# Patient Record
Sex: MALE | Race: BLACK OR AFRICAN AMERICAN | Employment: OTHER | URBAN - METROPOLITAN AREA
[De-identification: names, ages, dates, MRNs, and addresses within clinical notes are randomized per-mention and may not be internally consistent; named-entity substitution may affect disease eponyms.]

---

## 2020-04-08 ENCOUNTER — HOSPITAL ENCOUNTER (EMERGENCY)
Age: 75
Discharge: HOME | End: 2020-04-08
Attending: EMERGENCY MEDICINE
Payer: MEDICARE

## 2020-04-08 ENCOUNTER — APPOINTMENT (OUTPATIENT)
Dept: GENERAL RADIOLOGY | Age: 75
End: 2020-04-08
Payer: MEDICARE

## 2020-04-08 VITALS
HEIGHT: 72 IN | HEART RATE: 88 BPM | RESPIRATION RATE: 18 BRPM | DIASTOLIC BLOOD PRESSURE: 86 MMHG | SYSTOLIC BLOOD PRESSURE: 136 MMHG | BODY MASS INDEX: 32.7 KG/M2 | OXYGEN SATURATION: 94 % | WEIGHT: 241.44 LBS | TEMPERATURE: 98.2 F

## 2020-04-08 PROCEDURE — 71045 X-RAY EXAM CHEST 1 VIEW: CPT

## 2020-04-08 PROCEDURE — 99283 EMERGENCY DEPT VISIT LOW MDM: CPT

## 2020-04-08 RX ORDER — PSEUDOEPHEDRINE HCL 120 MG/1
120 TABLET, FILM COATED, EXTENDED RELEASE ORAL EVERY 12 HOURS PRN
Qty: 20 TABLET | Refills: 1 | Status: SHIPPED | OUTPATIENT
Start: 2020-04-08 | End: 2020-04-15

## 2020-04-08 ASSESSMENT — PAIN DESCRIPTION - ORIENTATION: ORIENTATION: LEFT

## 2020-04-08 ASSESSMENT — PAIN SCALES - GENERAL: PAINLEVEL_OUTOF10: 4

## 2020-04-08 ASSESSMENT — PAIN DESCRIPTION - LOCATION: LOCATION: EAR

## 2020-04-08 NOTE — ED PROVIDER NOTES
EMERGENCY DEPARTMENT PHYSICIAN DOCUMENTATION      CHIEF COMPLAINT  Otalgia and Cough      HISTORY OF PRESENT ILLNESS  Ginna Fan is a 69 y.o. male with complaint of Otalgia and Cough    Onset of symptoms 7 days ago. Sore throat is sharp, worse with swallowing, no radiation. No throat discharge. +blowing nose and + L ear pain  Associated with some nasal congestion. Cough is mildly productive, no hemoptysis. Occasional streak of blood in phlegm, however    No db fevers. +chills        REVIEW OF SYSTEMS  A full 10 point Review of Systems was performed and is negative aside from pertinent positives mentioned in HPI    ALLERGIES:  No Known Allergies    PAST HISTORY  Past Medical History:   Diagnosis Date    Cancer ([facility] [facility]     throat     COPD (chronic obstructive pulmonary disease) ([facility]        History reviewed. No pertinent family history. No current facility-administered medications on file prior to encounter. Current Outpatient Medications on File Prior to Encounter   Medication Sig Dispense Refill    tiotropium (SPIRIVA) 18 MCG inhalation capsule Inhale 18 mcg into the lungs daily      levothyroxine (SYNTHROID) 150 MCG tablet Take 150 mcg by mouth Daily      amLODIPine (NORVASC) 5 MG tablet Take 5 mg by mouth daily         Social History     Tobacco Use    Smoking status: Former Smoker     Types: Cigarettes     Last attempt to quit: 6/3/2005     Years since quittin.8   Substance Use Topics    Alcohol use: No    Drug use: Not on file         EXAM:   Presentation Vital Signs: Pulse 88   Temp 98.2 °F (36.8 °C) (Oral)   Resp 18   Ht 6' (1.829 m)   Wt 109.5 kg (241 lb 7 oz)   SpO2 94%   BMI 32.74 kg/m²     General: Well nourished, no acute distress  Head: No traumatic injury  ENT: MMM, no facial asymmetry, no nasal discharge  +L ear TM erythematous no purulence  Pharynx shows normal non-significantly enlarged tonsils without exudates.   No pharyngeal lesions or uvular deviation  Eyes: EOM  Neck: No tracheal deviation or stridor  Lungs: Respirations clear to ausculation, no respiratory distress  Cardiac: Regular rate and rhythm without gallops, murmurs, or rubs  Skin: no pallor, erythema, lesions or other abnormalities on exposed skin of face and arms  Extremities: Normal ROM of bilateral upper extremities at shoulders, elbows, wrists; normal ROM of bilateral LE at hips and knees. Neurologic: Alert, oriented x 3. No focal deficits upon moving arms and legs  Psychiatric: Appropriate demeanor without agitation or internal stimulation      MEDICAL DECISION MAKING  Well appearing patient here with viral appearing upper respiratory complaints of sore throat, nasal symptoms, and cough. Throat exam is reassuring without evidence of invasive disease, lungs are clear without significant wheezing or decreased sounds or rhonchi. Patient is well appearing. L serous otitis media L ear  CXR neg    Plan for dc with supportive therapies, and return instructions given for worsening symptoms. DISPOSITION  Home    IMPRESSION:  Viral upper respiratory illness  Pharyngitis  Cough  Bronchitis  Eustachian tube dysfunction  L otalgia  L serous otitis media    This medical chart used with aid of transcription software. As such, there may be inadvertent errors in transcription of spellings and words despite physician's attempts to correct all possible errors.          Leonel Milner MD  04/08/20 9040

## 2020-04-08 NOTE — ED NOTES
Patient prepared for and ready to be discharged. Patient discharged at this time in no acute distress after verbalizing understanding of discharge instructions. Patient left after receiving After Visit Summary instructions.       Daniele Andino RN  04/08/20 1015

## 2020-04-09 ENCOUNTER — CARE COORDINATION (OUTPATIENT)
Dept: FAMILY MEDICINE CLINIC | Age: 75
End: 2020-04-09

## 2020-04-10 ENCOUNTER — CARE COORDINATION (OUTPATIENT)
Dept: FAMILY MEDICINE CLINIC | Age: 75
End: 2020-04-10

## 2023-01-17 ENCOUNTER — APPOINTMENT (OUTPATIENT)
Dept: CT IMAGING | Age: 78
DRG: 432 | End: 2023-01-17
Payer: MEDICARE

## 2023-01-17 ENCOUNTER — HOSPITAL ENCOUNTER (INPATIENT)
Age: 78
LOS: 6 days | Discharge: HOME | DRG: 432 | End: 2023-01-23
Attending: EMERGENCY MEDICINE | Admitting: INTERNAL MEDICINE
Payer: MEDICARE

## 2023-01-17 DIAGNOSIS — I81 PORTAL VEIN THROMBOSIS: ICD-10-CM

## 2023-01-17 DIAGNOSIS — R74.01 TRANSAMINITIS: Primary | ICD-10-CM

## 2023-01-17 DIAGNOSIS — R16.0 LIVER MASS: ICD-10-CM

## 2023-01-17 DIAGNOSIS — K74.60 HEPATIC CIRRHOSIS, UNSPECIFIED HEPATIC CIRRHOSIS TYPE, UNSPECIFIED WHETHER ASCITES PRESENT (HCC): ICD-10-CM

## 2023-01-17 DIAGNOSIS — C22.0 HEPATOCELLULAR CARCINOMA (HCC): ICD-10-CM

## 2023-01-17 DIAGNOSIS — E80.6 HYPERBILIRUBINEMIA: ICD-10-CM

## 2023-01-17 PROBLEM — Z87.19 HISTORY OF LIVER FAILURE: Status: ACTIVE | Noted: 2023-01-17

## 2023-01-17 LAB
A/G RATIO: 0.7 (ref 1.1–2.2)
ALBUMIN SERPL-MCNC: 3.2 G/DL (ref 3.4–5)
ALP BLD-CCNC: 348 U/L (ref 40–129)
ALT SERPL-CCNC: 146 U/L (ref 10–40)
ANION GAP SERPL CALCULATED.3IONS-SCNC: 11 MMOL/L (ref 3–16)
AST SERPL-CCNC: 532 U/L (ref 15–37)
BASOPHILS ABSOLUTE: 0 K/UL (ref 0–0.2)
BASOPHILS RELATIVE PERCENT: 0.8 %
BILIRUB SERPL-MCNC: 2.3 MG/DL (ref 0–1)
BILIRUBIN URINE: NEGATIVE
BLOOD, URINE: NEGATIVE
BUN BLDV-MCNC: 9 MG/DL (ref 7–20)
CALCIUM SERPL-MCNC: 8.9 MG/DL (ref 8.3–10.6)
CHLORIDE BLD-SCNC: 102 MMOL/L (ref 99–110)
CLARITY: CLEAR
CO2: 22 MMOL/L (ref 21–32)
COLOR: YELLOW
CREAT SERPL-MCNC: 0.8 MG/DL (ref 0.8–1.3)
EOSINOPHILS ABSOLUTE: 0.2 K/UL (ref 0–0.6)
EOSINOPHILS RELATIVE PERCENT: 4.2 %
GFR SERPL CREATININE-BSD FRML MDRD: >60 ML/MIN/{1.73_M2}
GLUCOSE BLD-MCNC: 105 MG/DL (ref 70–99)
GLUCOSE URINE: NEGATIVE MG/DL
HCT VFR BLD CALC: 44.9 % (ref 40.5–52.5)
HEMOGLOBIN: 14.8 G/DL (ref 13.5–17.5)
KETONES, URINE: NEGATIVE MG/DL
LEUKOCYTE ESTERASE, URINE: NEGATIVE
LIPASE: 50 U/L (ref 13–60)
LYMPHOCYTES ABSOLUTE: 0.7 K/UL (ref 1–5.1)
LYMPHOCYTES RELATIVE PERCENT: 16.3 %
MCH RBC QN AUTO: 28.3 PG (ref 26–34)
MCHC RBC AUTO-ENTMCNC: 32.9 G/DL (ref 31–36)
MCV RBC AUTO: 85.9 FL (ref 80–100)
MICROSCOPIC EXAMINATION: ABNORMAL
MONOCYTES ABSOLUTE: 0.4 K/UL (ref 0–1.3)
MONOCYTES RELATIVE PERCENT: 10.2 %
NEUTROPHILS ABSOLUTE: 2.9 K/UL (ref 1.7–7.7)
NEUTROPHILS RELATIVE PERCENT: 68.5 %
NITRITE, URINE: NEGATIVE
PDW BLD-RTO: 18.8 % (ref 12.4–15.4)
PH UA: 6.5 (ref 5–8)
PLATELET # BLD: 223 K/UL (ref 135–450)
PMV BLD AUTO: 8.6 FL (ref 5–10.5)
POTASSIUM REFLEX MAGNESIUM: 4.3 MMOL/L (ref 3.5–5.1)
PROTEIN UA: NEGATIVE MG/DL
RBC # BLD: 5.22 M/UL (ref 4.2–5.9)
SODIUM BLD-SCNC: 135 MMOL/L (ref 136–145)
SPECIFIC GRAVITY UA: <=1.005 (ref 1–1.03)
TOTAL PROTEIN: 8 G/DL (ref 6.4–8.2)
TROPONIN: <0.01 NG/ML
URINE REFLEX TO CULTURE: ABNORMAL
URINE TYPE: ABNORMAL
UROBILINOGEN, URINE: 2 E.U./DL
WBC # BLD: 4.2 K/UL (ref 4–11)

## 2023-01-17 PROCEDURE — 85025 COMPLETE CBC W/AUTO DIFF WBC: CPT

## 2023-01-17 PROCEDURE — 80053 COMPREHEN METABOLIC PANEL: CPT

## 2023-01-17 PROCEDURE — 74177 CT ABD & PELVIS W/CONTRAST: CPT

## 2023-01-17 PROCEDURE — 84484 ASSAY OF TROPONIN QUANT: CPT

## 2023-01-17 PROCEDURE — 81003 URINALYSIS AUTO W/O SCOPE: CPT

## 2023-01-17 PROCEDURE — 2580000003 HC RX 258: Performed by: INTERNAL MEDICINE

## 2023-01-17 PROCEDURE — 83690 ASSAY OF LIPASE: CPT

## 2023-01-17 PROCEDURE — 6360000004 HC RX CONTRAST MEDICATION: Performed by: EMERGENCY MEDICINE

## 2023-01-17 PROCEDURE — 99285 EMERGENCY DEPT VISIT HI MDM: CPT

## 2023-01-17 PROCEDURE — 1200000000 HC SEMI PRIVATE

## 2023-01-17 PROCEDURE — 2580000003 HC RX 258: Performed by: EMERGENCY MEDICINE

## 2023-01-17 PROCEDURE — 6370000000 HC RX 637 (ALT 250 FOR IP): Performed by: INTERNAL MEDICINE

## 2023-01-17 RX ORDER — ONDANSETRON 2 MG/ML
4 INJECTION INTRAMUSCULAR; INTRAVENOUS EVERY 6 HOURS PRN
Status: DISCONTINUED | OUTPATIENT
Start: 2023-01-17 | End: 2023-01-20

## 2023-01-17 RX ORDER — M-VIT,TX,IRON,MINS/CALC/FOLIC 27MG-0.4MG
1 TABLET ORAL DAILY
COMMUNITY

## 2023-01-17 RX ORDER — LEVOTHYROXINE SODIUM 0.12 MG/1
TABLET ORAL
Status: ON HOLD | COMMUNITY
Start: 2022-10-26 | End: 2023-01-23 | Stop reason: HOSPADM

## 2023-01-17 RX ORDER — VALSARTAN 40 MG/1
TABLET ORAL
COMMUNITY
Start: 2023-01-16

## 2023-01-17 RX ORDER — ONDANSETRON 4 MG/1
4 TABLET, ORALLY DISINTEGRATING ORAL EVERY 8 HOURS PRN
Status: DISCONTINUED | OUTPATIENT
Start: 2023-01-17 | End: 2023-01-20

## 2023-01-17 RX ORDER — MULTIVIT WITH MINERALS/LUTEIN
250 TABLET ORAL DAILY
COMMUNITY

## 2023-01-17 RX ORDER — SODIUM CHLORIDE 0.9 % (FLUSH) 0.9 %
5-40 SYRINGE (ML) INJECTION PRN
Status: DISCONTINUED | OUTPATIENT
Start: 2023-01-17 | End: 2023-01-23 | Stop reason: HOSPADM

## 2023-01-17 RX ORDER — SODIUM CHLORIDE 9 MG/ML
INJECTION, SOLUTION INTRAVENOUS PRN
Status: DISCONTINUED | OUTPATIENT
Start: 2023-01-17 | End: 2023-01-23 | Stop reason: HOSPADM

## 2023-01-17 RX ORDER — VALSARTAN 80 MG/1
40 TABLET ORAL DAILY
Status: DISCONTINUED | OUTPATIENT
Start: 2023-01-17 | End: 2023-01-23 | Stop reason: HOSPADM

## 2023-01-17 RX ORDER — POLYETHYLENE GLYCOL 3350 17 G/17G
17 POWDER, FOR SOLUTION ORAL DAILY PRN
Status: DISCONTINUED | OUTPATIENT
Start: 2023-01-17 | End: 2023-01-23 | Stop reason: HOSPADM

## 2023-01-17 RX ORDER — 0.9 % SODIUM CHLORIDE 0.9 %
1000 INTRAVENOUS SOLUTION INTRAVENOUS ONCE
Status: COMPLETED | OUTPATIENT
Start: 2023-01-17 | End: 2023-01-17

## 2023-01-17 RX ORDER — ASPIRIN 81 MG/1
81 TABLET, CHEWABLE ORAL DAILY
COMMUNITY
Start: 2020-05-13

## 2023-01-17 RX ORDER — MULTIVITAMIN WITH IRON
100 TABLET ORAL DAILY
Status: DISCONTINUED | OUTPATIENT
Start: 2023-01-18 | End: 2023-01-23 | Stop reason: HOSPADM

## 2023-01-17 RX ORDER — SODIUM CHLORIDE 0.9 % (FLUSH) 0.9 %
5-40 SYRINGE (ML) INJECTION EVERY 12 HOURS SCHEDULED
Status: DISCONTINUED | OUTPATIENT
Start: 2023-01-17 | End: 2023-01-23 | Stop reason: HOSPADM

## 2023-01-17 RX ORDER — ASPIRIN 81 MG/1
81 TABLET, CHEWABLE ORAL DAILY
Status: DISCONTINUED | OUTPATIENT
Start: 2023-01-18 | End: 2023-01-23 | Stop reason: HOSPADM

## 2023-01-17 RX ORDER — ALBUTEROL SULFATE 2.5 MG/3ML
2.5 SOLUTION RESPIRATORY (INHALATION) EVERY 4 HOURS PRN
Status: DISCONTINUED | OUTPATIENT
Start: 2023-01-17 | End: 2023-01-23 | Stop reason: HOSPADM

## 2023-01-17 RX ORDER — ACETAMINOPHEN 325 MG/1
650 TABLET ORAL EVERY 6 HOURS PRN
Status: DISCONTINUED | OUTPATIENT
Start: 2023-01-17 | End: 2023-01-23 | Stop reason: HOSPADM

## 2023-01-17 RX ORDER — ALBUTEROL SULFATE 90 UG/1
2 AEROSOL, METERED RESPIRATORY (INHALATION) EVERY 6 HOURS PRN
COMMUNITY
Start: 2009-09-04

## 2023-01-17 RX ORDER — ATORVASTATIN CALCIUM 20 MG/1
20 TABLET, FILM COATED ORAL DAILY
Status: ON HOLD | COMMUNITY
Start: 2022-05-13 | End: 2023-01-23 | Stop reason: HOSPADM

## 2023-01-17 RX ORDER — ASCORBIC ACID 500 MG
250 TABLET ORAL DAILY
Status: DISCONTINUED | OUTPATIENT
Start: 2023-01-18 | End: 2023-01-23 | Stop reason: HOSPADM

## 2023-01-17 RX ORDER — ACETAMINOPHEN 650 MG/1
650 SUPPOSITORY RECTAL EVERY 6 HOURS PRN
Status: DISCONTINUED | OUTPATIENT
Start: 2023-01-17 | End: 2023-01-23 | Stop reason: HOSPADM

## 2023-01-17 RX ORDER — LACTULOSE 10 G/15ML
20 SOLUTION ORAL DAILY
Status: DISCONTINUED | OUTPATIENT
Start: 2023-01-17 | End: 2023-01-23 | Stop reason: HOSPADM

## 2023-01-17 RX ORDER — MULTIVITAMIN WITH IRON
100 TABLET ORAL DAILY
COMMUNITY

## 2023-01-17 RX ADMIN — SODIUM CHLORIDE 1000 ML: 9 INJECTION, SOLUTION INTRAVENOUS at 12:18

## 2023-01-17 RX ADMIN — APIXABAN 5 MG: 5 TABLET, FILM COATED ORAL at 22:26

## 2023-01-17 RX ADMIN — LACTULOSE 20 G: 20 SOLUTION ORAL at 22:26

## 2023-01-17 RX ADMIN — IOPAMIDOL 80 ML: 755 INJECTION, SOLUTION INTRAVENOUS at 13:16

## 2023-01-17 RX ADMIN — VALSARTAN 40 MG: 80 TABLET, FILM COATED ORAL at 23:14

## 2023-01-17 RX ADMIN — SODIUM CHLORIDE, PRESERVATIVE FREE 10 ML: 5 INJECTION INTRAVENOUS at 22:36

## 2023-01-17 ASSESSMENT — ENCOUNTER SYMPTOMS
FACIAL SWELLING: 0
BACK PAIN: 0
VOMITING: 0
STRIDOR: 0
ABDOMINAL PAIN: 1
NAUSEA: 1
TROUBLE SWALLOWING: 0
COLOR CHANGE: 0
BLOOD IN STOOL: 0
WHEEZING: 0
VOICE CHANGE: 0
PHOTOPHOBIA: 0
SHORTNESS OF BREATH: 0

## 2023-01-17 ASSESSMENT — PAIN DESCRIPTION - DESCRIPTORS: DESCRIPTORS: DISCOMFORT

## 2023-01-17 ASSESSMENT — PAIN DESCRIPTION - PAIN TYPE: TYPE: CHRONIC PAIN

## 2023-01-17 ASSESSMENT — LIFESTYLE VARIABLES
HOW OFTEN DO YOU HAVE A DRINK CONTAINING ALCOHOL: NEVER
HOW MANY STANDARD DRINKS CONTAINING ALCOHOL DO YOU HAVE ON A TYPICAL DAY: PATIENT DOES NOT DRINK

## 2023-01-17 ASSESSMENT — PAIN - FUNCTIONAL ASSESSMENT: PAIN_FUNCTIONAL_ASSESSMENT: 0-10

## 2023-01-17 ASSESSMENT — PAIN SCALES - GENERAL
PAINLEVEL_OUTOF10: 3
PAINLEVEL_OUTOF10: 7

## 2023-01-17 ASSESSMENT — PAIN DESCRIPTION - ORIENTATION
ORIENTATION: MID
ORIENTATION: RIGHT

## 2023-01-17 ASSESSMENT — PAIN DESCRIPTION - LOCATION
LOCATION: ABDOMEN
LOCATION: FLANK

## 2023-01-17 ASSESSMENT — PAIN DESCRIPTION - FREQUENCY: FREQUENCY: INTERMITTENT

## 2023-01-17 ASSESSMENT — PAIN DESCRIPTION - ONSET: ONSET: ON-GOING

## 2023-01-17 NOTE — ED PROVIDER NOTES
[facility]  eMERGENCY dEPARTMENT eNCOUnter      Pt Name: Johana Francisco  MRN: [id number]  [place] 1945  Date of evaluation: 1/17/2023  Provider: Erinn Dewitt MD    [address] [place] [address]       Chief Complaint   Patient presents with    Bloated     Pt states he feels like he's bloated and has to belch. Symptoms x past month. States he feels like he cannot eat, nauseated. Small BMs. Has tried a bunch of stool softeners. HISTORY OF PRESENT ILLNESS   (Location/Symptom, Timing/Onset, Context/Setting, Quality, Duration, Modifying Factors, Severity)  Note limiting factors. History obtained from: the patient    Johana Francisco is a 69 y.o. male who reports 1 month of abdominal bloating, indigestion, belching, and small hard bowel movements. Patient denies any complete inability to have bowel movements or pass gas but reports he does feel partially constipated. Patient reports he has tried castor oil, prune juice, and multiple over-the-counter stool softeners (although he is unaware of the names and does not have them with him) with mild improvement but no significant relief. Patient denies any fever chest pain or dysuria. Patient denies any trauma. HPI    Nursing Notes were reviewed. REVIEW OFSYSTEMS    (2-9 systems for level 4, 10 or more for level 5)     Review of Systems   Constitutional:  Negative for appetite change, fever and unexpected weight change. HENT:  Negative for facial swelling, trouble swallowing and voice change. Eyes:  Negative for photophobia and visual disturbance. Respiratory:  Negative for shortness of breath, wheezing and stridor. Cardiovascular:  Negative for chest pain and palpitations. Gastrointestinal:  Positive for abdominal pain and nausea. Negative for blood in stool and vomiting. Genitourinary:  Negative for difficulty urinating and dysuria. Musculoskeletal:  Negative for back pain, gait problem and neck pain.    Skin:  Negative for color change and wound. Neurological:  Negative for seizures, syncope and speech difficulty. Psychiatric/Behavioral:  Negative for self-injury and suicidal ideas. Except as noted above the remainder of the review of systems was reviewed and negative. PAST MEDICAL HISTORY     Past Medical History:   Diagnosis Date    Cancer ([facility] [address] [facility]     throat     COPD (chronic obstructive pulmonary disease) ([facility] [facility]          SURGICAL HISTORY     History reviewed. No pertinent surgical history. CURRENT MEDICATIONS       Previous Medications    ALBUTEROL SULFATE HFA (PROVENTIL;VENTOLIN;PROAIR) 108 (90 BASE) MCG/ACT INHALER    Inhale 2 puffs into the lungs every 6 hours as needed    AMLODIPINE (NORVASC) 5 MG TABLET    Take 5 mg by mouth daily    APIXABAN (ELIQUIS) 5 MG TABS TABLET    Take 5 mg by mouth 2 times daily    ASPIRIN 81 MG CHEWABLE TABLET    Take 81 mg by mouth daily    ATORVASTATIN (LIPITOR) 20 MG TABLET    Take 20 mg by mouth daily    BENZOCAINE (BABY ORAJEL) 7.5 % ORAL GEL    Apply 3-4 drops in affected ear every 3 hours as needed for ear pain. May sub 5-20% gel/liquid    LEVOTHYROXINE (SYNTHROID) 125 MCG TABLET        LEVOTHYROXINE (SYNTHROID) 150 MCG TABLET    Take 150 mcg by mouth Daily    TIOTROPIUM (SPIRIVA) 18 MCG INHALATION CAPSULE    Inhale 18 mcg into the lungs daily    VALSARTAN (DIOVAN) 40 MG TABLET        VALSARTAN PO    Take by mouth       ALLERGIES     Penicillins    FAMILY HISTORY     History reviewed. No pertinent family history.        SOCIAL HISTORY       Social History     Socioeconomic History    Marital status: Single     Spouse name: None    Number of children: None    Years of education: None    Highest education level: None   Tobacco Use    Smoking status: Former     Types: Cigarettes     Quit date: 6/3/2005     Years since quittin.6   Substance and Sexual Activity    Alcohol use: No         PHYSICAL EXAM    (up to 7 for level 4, 8 or more for level 5)     ED Triage Vitals [01/17/23 1115]   BP Temp Temp Source Heart Rate Resp SpO2 Height Weight   (!) 159/93 97.6 °F (36.4 °C) Oral 97 18 97 % 6' (1.829 m) 224 lb 8 oz (101.8 kg)       Physical Exam  Vitals and nursing note reviewed. Constitutional:       General: He is not in acute distress. Appearance: He is well-developed. HENT:      Head: Normocephalic and atraumatic. Right Ear: External ear normal.      Left Ear: External ear normal.   Eyes:      Conjunctiva/sclera: Conjunctivae normal.   Neck:      Vascular: No JVD. Trachea: No tracheal deviation. Cardiovascular:      Rate and Rhythm: Normal rate. Pulmonary:      Effort: Pulmonary effort is normal. No respiratory distress. Breath sounds: Normal breath sounds. No wheezing. Abdominal:      General: Bowel sounds are normal. There is no distension. Palpations: Abdomen is soft. Tenderness: There is abdominal tenderness. There is no guarding or rebound. Comments: Mild abdominal distention but no guarding rebound or peritoneal signs. Mild diffuse tenderness. No focal area of tenderness   Musculoskeletal:         General: No tenderness. Normal range of motion. Cervical back: Neck supple. Skin:     General: Skin is warm and dry. Neurological:      Mental Status: He is alert. Cranial Nerves: No cranial nerve deficit. DIAGNOSTIC RESULTS         RADIOLOGY:     Interpretation per the Radiologist below, if available at the time of this note:    CT ABDOMEN PELVIS W IV CONTRAST Additional Contrast? None   Final Result      1. Cirrhotic liver. 2. Innumerable small hypoattenuating nodules mostly in the right hepatic lobe, nonspecific/indeterminate, may relate to degenerative nodules, dysplastic nodules or HCC. Recommended follow-up with contrast-enhanced MRI using liver protocol. 3. Suspected nonocclusive thrombus in portal vein. 4. Mildly enlarged periportal lymph nodes. 5. Trace ascites.    6. Mild wall thickening of cecum/ascending colon with adjacent fat stranding likely relate to portal colonopathy. ED BEDSIDE ULTRASOUND:   Performed by ED Physician - none    LABS:  Labs Reviewed   CBC WITH AUTO DIFFERENTIAL - Abnormal; Notable for the following components:       Result Value    RDW 18.8 (*)     Lymphocytes Absolute 0.7 (*)     All other components within normal limits   COMPREHENSIVE METABOLIC PANEL W/ REFLEX TO MG FOR LOW K - Abnormal; Notable for the following components:    Sodium 135 (*)     Glucose 105 (*)     Albumin 3.2 (*)     Albumin/Globulin Ratio 0.7 (*)     Total Bilirubin 2.3 (*)     Alkaline Phosphatase 348 (*)      (*)      (*)     All other components within normal limits   URINALYSIS WITH REFLEX TO CULTURE - Abnormal; Notable for the following components:    Urobilinogen, Urine 2.0 (*)     All other components within normal limits   LIPASE   TROPONIN       All otherlabs were within normal range or not returned as of this dictation. EMERGENCY DEPARTMENT COURSE and DIFFERENTIAL DIAGNOSIS/MDM:   Vitals:    Vitals:    01/17/23 1115   BP: (!) 159/93   Pulse: 97   Resp: 18   Temp: 97.6 °F (36.4 °C)   TempSrc: Oral   SpO2: 97%   Weight: 224 lb 8 oz (101.8 kg)   Height: 6' (1.829 m)         Is this patient to be included in the SEP-1 Core Measure due to severe sepsis or septic shock? No   Exclusion criteria - the patient is NOT to be included for SEP-1 Core Measure due to:  2+ SIRS criteria are not met      CONSULTS: (Who and What was discussed)  IP CONSULT TO HOSPITALIST    CC/HPI Summary, DDx, ED Course, Reassessment, Disposition Considerations (include Tests not done, Admit vs D/C, Shared Decision Making, Pt Expectation of Test or Tx.):  Patient has elevated bilirubin and slightly elevated LFTs and CT abdomen pelvis shows numerous findings including cirrhotic liver, liver densities concerning for hepatic cell carcinoma, and a partially occlusive portal vein thrombus.   Patient is admitted to the hospital service for further evaluation and care. Patient expresses understanding and agreement with this plan. Patient evaluated numerous times with no acute worsening clinical status. Critical Care  Performed by: Tory Ceballos MD  Authorized by: Tory Ceballos MD     Critical care provider statement:     Critical care time (minutes):  32    Critical care time was exclusive of:  Separately billable procedures and treating other patients and teaching time    Critical care was necessary to treat or prevent imminent or life-threatening deterioration of the following conditions:  Hepatic failure and shock    Critical care was time spent personally by me on the following activities:  Ordering and performing treatments and interventions, development of treatment plan with patient or surrogate, ordering and review of laboratory studies, ordering and review of radiographic studies, discussions with consultants, examination of patient and obtaining history from patient or surrogate        FINAL IMPRESSION      1. Transaminitis    2. Hyperbilirubinemia    3. Liver mass    4. Portal vein thrombosis          DISPOSITION/PLAN     DISPOSITION Decision To Admit 01/17/2023 01:43:41 PM      (Please note that portions of this note were completed with a voice recognition program.  Efforts were made to edit the dictations but occasionally words are mis-transcribed. )    Tory Ceballos MD (electronically signed)  Attending Emergency Physician           Tory Ceballos MD  01/17/23 9662

## 2023-01-17 NOTE — ED NOTES
Called report to [address] at [facility].       Eufemia Coyle RN  01/17/23 Binta Concepcion

## 2023-01-18 PROBLEM — Z86.711 HX OF PULMONARY EMBOLUS: Status: ACTIVE | Noted: 2023-01-18

## 2023-01-18 PROBLEM — I81 PORTAL VEIN THROMBOSIS: Status: ACTIVE | Noted: 2023-01-18

## 2023-01-18 PROBLEM — K74.60 CIRRHOSIS (HCC): Status: ACTIVE | Noted: 2023-01-18

## 2023-01-18 PROBLEM — K59.00 CONSTIPATION: Status: ACTIVE | Noted: 2023-01-18

## 2023-01-18 LAB
ALBUMIN SERPL-MCNC: 3.1 G/DL (ref 3.4–5)
ALP BLD-CCNC: 337 U/L (ref 40–129)
ALT SERPL-CCNC: 139 U/L (ref 10–40)
ANION GAP SERPL CALCULATED.3IONS-SCNC: 12 MMOL/L (ref 3–16)
AST SERPL-CCNC: 509 U/L (ref 15–37)
BASOPHILS ABSOLUTE: 0 K/UL (ref 0–0.2)
BASOPHILS RELATIVE PERCENT: 0.4 %
BILIRUB SERPL-MCNC: 2.2 MG/DL (ref 0–1)
BILIRUBIN DIRECT: 1.2 MG/DL (ref 0–0.3)
BILIRUBIN, INDIRECT: 1 MG/DL (ref 0–1)
BUN BLDV-MCNC: 8 MG/DL (ref 7–20)
CALCIUM SERPL-MCNC: 8.5 MG/DL (ref 8.3–10.6)
CHLORIDE BLD-SCNC: 101 MMOL/L (ref 99–110)
CO2: 21 MMOL/L (ref 21–32)
CREAT SERPL-MCNC: 0.6 MG/DL (ref 0.8–1.3)
EOSINOPHILS ABSOLUTE: 0.2 K/UL (ref 0–0.6)
EOSINOPHILS RELATIVE PERCENT: 3.8 %
GFR SERPL CREATININE-BSD FRML MDRD: >60 ML/MIN/{1.73_M2}
GLUCOSE BLD-MCNC: 108 MG/DL (ref 70–99)
HCT VFR BLD CALC: 42.4 % (ref 40.5–52.5)
HCT VFR BLD CALC: 42.5 % (ref 40.5–52.5)
HEMOGLOBIN: 14 G/DL (ref 13.5–17.5)
HEMOGLOBIN: 14.2 G/DL (ref 13.5–17.5)
INR BLD: 1.44 (ref 0.87–1.14)
INR BLD: 1.49 (ref 0.87–1.14)
LYMPHOCYTES ABSOLUTE: 0.7 K/UL (ref 1–5.1)
LYMPHOCYTES RELATIVE PERCENT: 17.5 %
MCH RBC QN AUTO: 28.6 PG (ref 26–34)
MCH RBC QN AUTO: 29.1 PG (ref 26–34)
MCHC RBC AUTO-ENTMCNC: 32.9 G/DL (ref 31–36)
MCHC RBC AUTO-ENTMCNC: 33.4 G/DL (ref 31–36)
MCV RBC AUTO: 86.9 FL (ref 80–100)
MCV RBC AUTO: 87.3 FL (ref 80–100)
MONOCYTES ABSOLUTE: 0.5 K/UL (ref 0–1.3)
MONOCYTES RELATIVE PERCENT: 12 %
NEUTROPHILS ABSOLUTE: 2.8 K/UL (ref 1.7–7.7)
NEUTROPHILS RELATIVE PERCENT: 66.3 %
PDW BLD-RTO: 18.4 % (ref 12.4–15.4)
PDW BLD-RTO: 18.9 % (ref 12.4–15.4)
PHOSPHORUS: 2.7 MG/DL (ref 2.5–4.9)
PLATELET # BLD: 178 K/UL (ref 135–450)
PLATELET # BLD: 185 K/UL (ref 135–450)
PMV BLD AUTO: 8.9 FL (ref 5–10.5)
PMV BLD AUTO: 9.2 FL (ref 5–10.5)
POTASSIUM SERPL-SCNC: 4.3 MMOL/L (ref 3.5–5.1)
PROTHROMBIN TIME: 17.5 SEC (ref 11.7–14.5)
PROTHROMBIN TIME: 18 SEC (ref 11.7–14.5)
RBC # BLD: 4.87 M/UL (ref 4.2–5.9)
RBC # BLD: 4.88 M/UL (ref 4.2–5.9)
SODIUM BLD-SCNC: 134 MMOL/L (ref 136–145)
T4 FREE: 1.9 NG/DL (ref 0.9–1.8)
TOTAL PROTEIN: 7.3 G/DL (ref 6.4–8.2)
TSH REFLEX: 11.14 UIU/ML (ref 0.27–4.2)
WBC # BLD: 4.3 K/UL (ref 4–11)
WBC # BLD: 4.3 K/UL (ref 4–11)

## 2023-01-18 PROCEDURE — 6370000000 HC RX 637 (ALT 250 FOR IP): Performed by: INTERNAL MEDICINE

## 2023-01-18 PROCEDURE — 85027 COMPLETE CBC AUTOMATED: CPT

## 2023-01-18 PROCEDURE — 84443 ASSAY THYROID STIM HORMONE: CPT

## 2023-01-18 PROCEDURE — 6370000000 HC RX 637 (ALT 250 FOR IP): Performed by: STUDENT IN AN ORGANIZED HEALTH CARE EDUCATION/TRAINING PROGRAM

## 2023-01-18 PROCEDURE — 36415 COLL VENOUS BLD VENIPUNCTURE: CPT

## 2023-01-18 PROCEDURE — 80076 HEPATIC FUNCTION PANEL: CPT

## 2023-01-18 PROCEDURE — 82390 ASSAY OF CERULOPLASMIN: CPT

## 2023-01-18 PROCEDURE — 84439 ASSAY OF FREE THYROXINE: CPT

## 2023-01-18 PROCEDURE — 2580000003 HC RX 258: Performed by: INTERNAL MEDICINE

## 2023-01-18 PROCEDURE — 85025 COMPLETE CBC W/AUTO DIFF WBC: CPT

## 2023-01-18 PROCEDURE — 1200000000 HC SEMI PRIVATE

## 2023-01-18 PROCEDURE — 80069 RENAL FUNCTION PANEL: CPT

## 2023-01-18 PROCEDURE — 80074 ACUTE HEPATITIS PANEL: CPT

## 2023-01-18 PROCEDURE — 85610 PROTHROMBIN TIME: CPT

## 2023-01-18 PROCEDURE — 82105 ALPHA-FETOPROTEIN SERUM: CPT

## 2023-01-18 PROCEDURE — 86015 ACTIN ANTIBODY EACH: CPT

## 2023-01-18 RX ORDER — BISACODYL 10 MG
10 SUPPOSITORY, RECTAL RECTAL ONCE
Status: COMPLETED | OUTPATIENT
Start: 2023-01-18 | End: 2023-01-18

## 2023-01-18 RX ADMIN — APIXABAN 5 MG: 5 TABLET, FILM COATED ORAL at 21:40

## 2023-01-18 RX ADMIN — OXYCODONE HYDROCHLORIDE AND ACETAMINOPHEN 250 MG: 500 TABLET ORAL at 08:51

## 2023-01-18 RX ADMIN — BISACODYL 20 MG: 5 TABLET, COATED ORAL at 17:05

## 2023-01-18 RX ADMIN — SODIUM CHLORIDE, PRESERVATIVE FREE 10 ML: 5 INJECTION INTRAVENOUS at 08:52

## 2023-01-18 RX ADMIN — APIXABAN 5 MG: 5 TABLET, FILM COATED ORAL at 08:51

## 2023-01-18 RX ADMIN — ASPIRIN 81 MG: 81 TABLET, CHEWABLE ORAL at 08:51

## 2023-01-18 RX ADMIN — LACTULOSE 20 G: 20 SOLUTION ORAL at 08:51

## 2023-01-18 RX ADMIN — SODIUM CHLORIDE, PRESERVATIVE FREE 10 ML: 5 INJECTION INTRAVENOUS at 21:00

## 2023-01-18 RX ADMIN — Medication 10 MG: at 08:55

## 2023-01-18 RX ADMIN — POLYETHYLENE GLYCOL 3350, SODIUM SULFATE ANHYDROUS, SODIUM BICARBONATE, SODIUM CHLORIDE, POTASSIUM CHLORIDE 2000 ML: 236; 22.74; 6.74; 5.86; 2.97 POWDER, FOR SOLUTION ORAL at 16:20

## 2023-01-18 RX ADMIN — VALSARTAN 40 MG: 80 TABLET, FILM COATED ORAL at 08:51

## 2023-01-18 RX ADMIN — Medication 100 MG: at 10:03

## 2023-01-18 RX ADMIN — MUPIROCIN: 20 OINTMENT TOPICAL at 17:21

## 2023-01-18 ASSESSMENT — PAIN SCALES - GENERAL
PAINLEVEL_OUTOF10: 3
PAINLEVEL_OUTOF10: 3

## 2023-01-18 NOTE — PROGRESS NOTES
Hospital Medicine Care  Progress Note      Chief complain; Bloated (Pt states he feels like he's bloated and has to belch. Symptoms x past month. States he feels like he cannot eat, nauseated. Small BMs. Has tried a bunch of stool softeners.)            Subjective:     Complaining of bloating, constipation  Denies any chest pain, dyspnea      Review of Systems:     Review of Systems as mentioned above, other ros is negative. Objective:       Medications        Scheduled Meds:   sodium chloride flush  5-40 mL IntraVENous 2 times per day    lactulose  20 g Oral Daily    apixaban  5 mg Oral BID    vitamin C  250 mg Oral Daily    aspirin  81 mg Oral Daily    vitamin B-6  100 mg Oral Daily    valsartan  40 mg Oral Daily     Continuous Infusions:   sodium chloride       PRN Meds:.sodium chloride, sodium chloride flush, sodium chloride, ondansetron **OR** ondansetron, polyethylene glycol, acetaminophen **OR** acetaminophen, albuterol      Allergies  Allergies   Allergen Reactions    Penicillins          Vitals:    01/18/23 0215 01/18/23 0545 01/18/23 0600 01/18/23 0601   BP: (!) 178/98 (!) 181/121 (!) 178/120 (!) 136/98   Pulse:  (!) 101     Resp:       Temp:  99.3 °F (37.4 °C)     TempSrc:  Oral     SpO2:  97%     Weight:       Height:             Physical exam:         Physical Exam  Constitutional:       Appearance: Normal appearance. HENT:      Head: Normocephalic and atraumatic. Cardiovascular:      Rate and Rhythm: Normal rate and regular rhythm. Pulses: Normal pulses. Heart sounds: Normal heart sounds. Pulmonary:      Effort: Pulmonary effort is normal. No respiratory distress. Breath sounds: Normal breath sounds. No wheezing. Abdominal:      General: Abdomen is flat. Bowel sounds are normal. There is no distension. Palpations: Abdomen is soft. Tenderness: There is no abdominal tenderness. Musculoskeletal:         General: No swelling or deformity. Normal range of motion. Skin:     General: Skin is warm and dry. Capillary Refill: Capillary refill takes less than 2 seconds. Neurological:      General: No focal deficit present. Mental Status: He is alert and oriented to person, place, and time. Psychiatric:         Mood and Affect: Mood normal.         Behavior: Behavior normal.             Labs      Recent Labs     01/17/23 1220 01/18/23  0612   WBC 4.2 4.3   HGB 14.8 14.2   HCT 44.9 42.5    185   MCV 85.9 87.3        Recent Labs     01/17/23 1220 01/18/23  0612   * 134*   K 4.3 4.3    101   CO2 22 21   PHOS  --  2.7   BUN 9 8   CREATININE 0.8 0.6*       Recent Labs     01/17/23 1220 01/18/23 0612   * 509*   * 139*   BILIDIR  --  1.2*   BILITOT 2.3* 2.2*   ALKPHOS 348* 337*       No results for input(s): MG in the last 72 hours. Radiology  CT ABDOMEN PELVIS W IV CONTRAST Additional Contrast? None   Final Result      1. Cirrhotic liver. 2. Innumerable small hypoattenuating nodules mostly in the right hepatic lobe, nonspecific/indeterminate, may relate to degenerative nodules, dysplastic nodules or HCC. Recommended follow-up with contrast-enhanced MRI using liver protocol. 3. Suspected nonocclusive thrombus in portal vein. 4. Mildly enlarged periportal lymph nodes. 5. Trace ascites. 6. Mild wall thickening of cecum/ascending colon with adjacent fat stranding likely relate to portal colonopathy. MRI ABDOMEN W WO CONTRAST    (Results Pending)           Assessment and Plan:   Principal Problem:    Cirrhosis ([address]  Active Problems:    Liver mass    Portal vein thrombosis    Hx of pulmonary embolus    Constipation  Resolved Problems:    * No resolved hospital problems.  *     Cirrhosis  New finding  Unclear etiology, denies alcohol use  Hepatitis panel: Pending  Will order anti-smooth muscle antibody  We will check serial plasmin levels  GI consulted  MRI abdomen ordered    Portal vein thrombosis  Continue with Eliquis    History of PE  Continue with Eliquis    Constipation  Dulcolax suppository x1  Continue with lactulose            Jami Powell MD  1/18/2023

## 2023-01-18 NOTE — PROGRESS NOTES
BP (!) 181/121   Pulse (!) 101   Temp 99.3 °F (37.4 °C) (Oral)   Resp 18   Ht 5' 11\" (1.803 m)   Wt 222 lb 10.6 oz (101 kg)   SpO2 97%   BMI 31.06 kg/m²     Perfect serve message to MD:  70 y/o here for new liver cirrhosis. manual BP this morning 178/120 Right and 136/98 Left. do you want to add a PRN?     Received NNOs

## 2023-01-18 NOTE — PROGRESS NOTES
4 Eyes Skin Assessment     NAME:  Jessica Daigle  YOB: 1945  MEDICAL RECORD NUMBER:  [id number]    The patient is being assessed for  Admission    I agree that One RN have performed a thorough Head to Toe Skin Assessment on the patient. ALL assessment sites listed below have been assessed. Areas assessed by both nurses:    Head, Face, Ears, Shoulders, Back, Chest, Arms, Elbows, Hands, Sacrum. Buttock, Coccyx, Ischium, and Legs. Feet and Heels        Does the Patient have a Wound?  No noted wound(s)       Glen Prevention initiated by RN: NA   Wound Care Orders initiated by RN: NA    Pressure Injury (Stage 3,4, Unstageable, DTI, NWPT, and Complex wounds) if present place referral order by RN under : NA    New and Established Ostomies, if present place, referral order under : NA      Nurse 1 eSignature: Electronically signed by Oc Palacios RN on 1/18/23 at 2:28 AM EST    **SHARE this note so that the co-signing nurse is able to place an eSignature**    Skin check with Josey RN (6th floor charge)  Nurse 2 eSignature: {Esignature:[id number]}

## 2023-01-18 NOTE — RT PROTOCOL NOTE
RT Inhaler-Nebulizer Bronchodilator Protocol Note    There is a bronchodilator order in the chart from a provider indicating to follow the RT Bronchodilator Protocol and there is an Initiate RT Inhaler-Nebulizer Bronchodilator Protocol order as well (see protocol at bottom of note). CXR Findings:  No results found. The findings from the last RT Protocol Assessment were as follows:   History Pulmonary Disease: Chronic pulmonary disease  Respiratory Pattern: Regular pattern and RR 12-20 bpm  Breath Sounds: Clear breath sounds  Cough: Strong, spontaneous, non-productive  Indication for Bronchodilator Therapy:    Bronchodilator Assessment Score: 2    Aerosolized bronchodilator medication orders have been revised according to the RT Inhaler-Nebulizer Bronchodilator Protocol below. Respiratory Therapist to perform RT Therapy Protocol Assessment initially then follow the protocol. Repeat RT Therapy Protocol Assessment PRN for score 0-3 or on second treatment, BID, and PRN for scores above 3. No Indications - adjust the frequency to every 6 hours PRN wheezing or bronchospasm, if no treatments needed after 48 hours then discontinue using Per Protocol order mode. If indication present, adjust the RT bronchodilator orders based on the Bronchodilator Assessment Score as indicated below. Use Inhaler orders unless patient has one or more of the following: on home nebulizer, not able to hold breath for 10 seconds, is not alert and oriented, cannot activate and use MDI correctly, or respiratory rate 25 breaths per minute or more, then use the equivalent nebulizer order(s) with same Frequency and PRN reasons based on the score. If a patient is on this medication at home then do not decrease Frequency below that used at home.     0-3 - enter or revise RT bronchodilator order(s) to equivalent RT Bronchodilator order with Frequency of every 4 hours PRN for wheezing or increased work of breathing using Per Protocol order mode. 4-6 - enter or revise RT Bronchodilator order(s) to two equivalent RT bronchodilator orders with one order with BID Frequency and one order with Frequency of every 4 hours PRN wheezing or increased work of breathing using Per Protocol order mode. 7-10 - enter or revise RT Bronchodilator order(s) to two equivalent RT bronchodilator orders with one order with TID Frequency and one order with Frequency of every 4 hours PRN wheezing or increased work of breathing using Per Protocol order mode. 11-13 - enter or revise RT Bronchodilator order(s) to one equivalent RT bronchodilator order with QID Frequency and an Albuterol order with Frequency of every 4 hours PRN wheezing or increased work of breathing using Per Protocol order mode. Greater than 13 - enter or revise RT Bronchodilator order(s) to one equivalent RT bronchodilator order with every 4 hours Frequency and an Albuterol order with Frequency of every 2 hours PRN wheezing or increased work of breathing using Per Protocol order mode. RT to enter RT Home Evaluation for COPD & MDI Assessment order using Per Protocol order mode.     Electronically signed by Marlena Brooke RCP on 1/17/2023 at 9:58 PM

## 2023-01-18 NOTE — CARE COORDINATION
CM  following  for  d/c  planning:    CM met with pt  at  bedside he states he is from home alone and  is  Indep with mobility and ADL's at baseline. He plans to return home  at  discharge and  does no  anticipate any  d/c  needs at this time. Patient  admitted  with  Cirrhosis: Liver  Lesion,  Abd pain and  bloating:  GI consulted:  NPO  MRI  abdomen . CM  will cont to follow  and  assist  with  d/c  planning . Electronically signed by Ericka Chopra RN on 1/18/2023 at 4:58 PM     Ericka Chopra RN Case Manager  [facility]  [address] [place] [address] Marisol Cortez.   [facility] [address]  [phone]  Fax [phone]

## 2023-01-18 NOTE — PROGRESS NOTES
BP (!) 192/121   Pulse (!) 102   Temp 98 °F (36.7 °C) (Oral)   Resp 18   Ht 5' 11\" (1.803 m)   Wt 222 lb 10.6 oz (101 kg)   SpO2 98%   BMI 31.06 kg/m²     BP elevated. Denies HA, Tinnitus, visual changes. Notified  MD via perfect serve. Orders to recheck in 2 hours. MRI scheduled for tomorrow d/t Presence of Pacemaker.

## 2023-01-18 NOTE — CONSULTS
[address] [place] [address]  GI Consultation      Patient: Holly Stacy  : 1945       Date:  2023    Subjective:       History of Present Illness  Patient is a 69 y.o.  male admitted with Portal vein thrombosis [I81]  Hyperbilirubinemia [E80.6]  Transaminitis [R74.01]  Liver mass [R16.0]  History of liver failure [Z87.19] who is seen in consult for liver masses with elevated liver enzymes. Mr. Tab Francisco is a 15-year-old male past medical history of PE on Eliquis, COPD who presented to [facility]. as a transfer from [facility] with complaints constipation and bloating. In the emergency department patient was hemodynamically stable, blood pressure 159/93, heart rate 97. Saturating well on room air. CT abdomen pelvis revealed  1. Cirrhotic liver. 2. Innumerable small hypoattenuating nodules mostly in the right hepatic lobe, nonspecific/indeterminate, may relate to degenerative nodules, dysplastic nodules or HCC. Recommended follow-up with contrast-enhanced MRI using liver protocol. 3. Suspected nonocclusive thrombus in portal vein. 4. Mildly enlarged periportal lymph nodes. 5. Trace ascites. 6. Mild wall thickening of cecum/ascending colon with adjacent fat stranding likely relate to portal colonopathy   Patient was recently seen by his primary care physician at [facility] on  with complaints of pain and bloating in the abdomen. Patient was given GoLytely for constipation. It was recommended that patient undergo a colonoscopy however it does not appear that this was done. Past Medical History:   Diagnosis Date    Cancer ([facility]     throat     COPD (chronic obstructive pulmonary disease) ([facility]     Encounter for imaging to screen for metal prior to MRI 2023    MRI Conditional Pacemaker [employer] Edora 8 HF-T QP model#643184 Leads: -177, -179, LV [id number]. Normal Mode. [employer] Rep and RN must be present. Pt must be A/OX4  per [employer] guidelines.  Follow all other [employer] guidelines. Pt currrently follows  at       History reviewed. No pertinent surgical history. Past Endoscopic History no prior EGD or colonoscopy    Admission Meds  No current facility-administered medications on file prior to encounter. Current Outpatient Medications on File Prior to Encounter   Medication Sig Dispense Refill    mupirocin (BACTROBAN NASAL) 2 % nasal ointment by Nasal route 2 times daily      sodium chloride (OCEAN, BABY AYR) 0.65 % nasal spray 1 spray by Nasal route as needed for Congestion      VALSARTAN PO Take by mouth      albuterol sulfate HFA (PROVENTIL;VENTOLIN;PROAIR) 108 (90 Base) MCG/ACT inhaler Inhale 2 puffs into the lungs every 6 hours as needed      apixaban (ELIQUIS) 5 MG TABS tablet Take 5 mg by mouth 2 times daily      aspirin 81 MG chewable tablet Take 81 mg by mouth daily      atorvastatin (LIPITOR) 20 MG tablet Take 20 mg by mouth daily      Multiple Vitamins-Minerals (THERAPEUTIC MULTIVITAMIN-MINERALS) tablet Take 1 tablet by mouth daily      Ascorbic Acid (VITAMIN C) 250 MG tablet Take 250 mg by mouth daily      vitamin B-6 (PYRIDOXINE) 100 MG tablet Take 100 mg by mouth daily      levothyroxine (SYNTHROID) 125 MCG tablet  (Patient not taking: No sig reported)      valsartan (DIOVAN) 40 MG tablet       benzocaine (BABY ORAJEL) 7.5 % oral gel Apply 3-4 drops in affected ear every 3 hours as needed for ear pain. May sub 5-20% gel/liquid (Patient not taking: Reported on 1/17/2023) 1 Tube 0    tiotropium (SPIRIVA) 18 MCG inhalation capsule Inhale 18 mcg into the lungs daily      levothyroxine (SYNTHROID) 150 MCG tablet Take 150 mcg by mouth Daily (Patient not taking: No sig reported)      amLODIPine (NORVASC) 5 MG tablet Take 5 mg by mouth daily (Patient not taking: Reported on 1/17/2023)         Patient uses ASA, denies NSAID use.     Allergies  Allergies   Allergen Reactions    Penicillins       Social   Social History     Tobacco Use    Smoking status: Former     Types: Cigarettes     Quit date: 6/3/2005     Years since quittin.6    Smokeless tobacco: Not on file   Substance Use Topics    Alcohol use: No        History reviewed. No pertinent family history. No family history of colon cancer, Crohn's disease, or ulcerative colitis. Review of Systems  Pertinent items are noted in HPI. Physical Exam    BP (!) 136/98   Pulse (!) 101   Temp 99.3 °F (37.4 °C) (Oral)   Resp 18   Ht 5' 11\" (1.803 m)   Wt 222 lb 10.6 oz (101 kg)   SpO2 97%   BMI 31.06 kg/m²   General appearance: alert, cooperative, no distress, appears stated age  Anicteric, No Jaundice  Head: Normocephalic, without obvious abnormality  Lungs: clear to auscultation bilaterally, Normal Effort  Heart: regular rate and rhythm, normal S1 and S2, no murmurs or rubs  Abdomen: soft, non-tender; bowel sounds normal; no masses,  no organomegaly  Extremities: atraumatic, no cyanosis or edema  Skin: warm and dry  Neuro: intact  AAOX3      Data Review:    Recent Labs     23  1220 23  0612   WBC 4.2 4.3   HGB 14.8 14.2   HCT 44.9 42.5   MCV 85.9 87.3    185     Recent Labs     23  1220 23  0612   * 134*   K 4.3 4.3    101   CO2 22 21   PHOS  --  2.7   BUN 9 8   CREATININE 0.8 0.6*     Recent Labs     23  1220 23  0612   * 509*   * 139*   BILIDIR  --  1.2*   BILITOT 2.3* 2.2*   ALKPHOS 348* 337*     Recent Labs     23  1220   LIPASE 50.0     Recent Labs     23  0612   PROTIME 17.5*   INR 1.44*     No results for input(s): PTT in the last 72 hours. No results for input(s): OCCULTBLD in the last 72 hours. Imaging Studies:                            Ultrasound: 2015  The liver is normal in echogenicity and homogeneous in echotexture. There are no apparent focal hepatic masses. There is no intrahepatic biliary ductal dilation. The common bile duct is normal in caliber, measuring approximately 3 mm.      The gallbladder is collapsed but otherwise normal. No free fluid is seen in the abdomen. The pancreas is incompletely visualized; however, the visible portions appear normal.     The kidneys demonstrate normal size and echogenicity. There is no hydronephrosis. The right kidney measures 11.2 cm in size. The left kidney measures 12.8 cm in size. The spleen is normal in size, and measures 12.6 cm in longest dimension. The visualized portions of the aorta and IVC are normal in caliber and demonstrate internal flow. CT-scan of abdomen and pelvis: No prior                 Assessment:     Principal Problem:    Cirrhosis (HCC)  Active Problems:    Liver mass    Portal vein thrombosis    Hx of pulmonary embolus    Constipation  Resolved Problems:    * No resolved hospital problems. *        Right hepatic lobe nodules  Recommendations:     -Recommend MRI of the liver with contrast  -Hepatitis panel, AFP, F-actin, ceruloplasmin pending     Thank you for the opportunity to participate in [address] [place] [address].   Will discuss with attending physician Dr. Bailey Liu, DO  PGY-3, Internal Medicine

## 2023-01-18 NOTE — PROGRESS NOTES
Comprehensive Nutrition Assessment    RECOMMENDATIONS:  PO Diet: Start oral diet as appropriate  ONS: N/A  Nutrition Education: Education not indicated     NUTRITION ASSESSMENT:   Nutritional summary & status: MST 2. Pt was seen in room and is currently NPO. Pt reports he has lost 15 lbs in 1 month due to bloating and discomfort. Pt said he had half a sandwich in th morning but it caused him discomfort so he stopped. He states he feels very hungry and would like to eat as soon as possible. Anticipate better intake when diet advances due to stated appetite, will reassess for ONS need. Admission/PMH: Hx of PE on Eliquis, COPD who presented to [facility]. as a transfer from [address] [place] [address] with complaints constipation and bloating    MALNUTRITION ASSESSMENT  Context of Malnutrition: Chronic Illness   Malnutrition Status:  At risk for malnutrition (Comment)  Findings of the 6 clinical characteristics of malnutrition (Minimum of 2 out of 6 clinical characteristics is required to make the diagnosis of moderate or severe Protein Calorie Malnutrition based on AND/ASPEN Guidelines):  Energy Intake:  75% or less estimated energy requirements for 1 month or longer  Weight Loss:  Unable to assess     Body Fat Loss:  No significant body fat loss     Muscle Mass Loss:  No significant muscle mass loss    Fluid Accumulation:  No significant fluid accumulation     Strength:  Not Performed    NUTRITION DIAGNOSIS   Inadequate oral intake related to pain as evidenced by NPO or clear liquid status due to medical condition    Nutrition Monitoring and Evaluation:   Food/Nutrient Intake Outcomes:  Diet Advancement/Tolerance, Food and Nutrient Intake  Physical Signs/Symptoms Outcomes:  Nutrition Focused Physical Findings, Biochemical Data, Weight     OBJECTIVE DATA: Significant to nutrition assessment  Nutrition Related Findings: no edema, Na 134, distended abdomen  Wounds:    Nutrition Goals: by next RD assessment, PO intake 50% or greater, Initiate PO diet     CURRENT NUTRITION THERAPIES  Diet NPO Exceptions are: Sips of Water with Meds  PO Intake: NPO   PO Supplement Intake:NPO  Additional Sources of Calories/IVF:      ANTHROPOMETRICS  Current Height: 5' 11\" (180.3 cm)  Current Weight: 222 lb 10.6 oz (101 kg)    Admission weight: 224 lb 8 oz (101.8 kg)  Ideal Body Weight (IBW): 172 lbs  (78 kg)    Usual Bodyweight     BMI: 31.1    COMPARATIVE STANDARDS  Energy (kcal):  1515 - 1818 (15 - 18 kcal/kg)     Protein (g):  94 - 117 (1.2 - 1.5 g/kg IBW)       Fluid (mL/day):  1515 - 1818 (1 ml/kcal) or per provider    The patient will be monitored per nutrition standards of care. Consult dietitian if additional nutrition interventions are needed prior to RD reassessment.      Nick Coe, [address]:  [phone]  Office:  [phone]

## 2023-01-18 NOTE — H&P
Hospital Medicine History & Physical      PCP: Eva     Date of Admission: 1/17/2023    Date of Service: Pt seen/examined on 1/17/2023 and Admitted to Inpatient with expected LOS greater than two midnights due to medical therapy. Chief Complaint: Abdominal distention, bloating      History Of Present Illness: The patient is a 69 y.o. male with medical history as below also notable for recent pulmonary embolism diagnosed in October 2022 on Eliquis who presents to [facility] as a transfer from [address] [place] [address] for evaluation of liver masses. Patient reports that he has been having constipation on and feeling bloated for the past couple of months. He had some work-up done as outpatient including abdominal ultrasound in October which showed heterogenous liver but no discrete lesions. He was trying different laxatives but was having very small bowel movements and occasional emesis. Denies any hematemesis or blood in stool. Discussed with emergency room physician who reports that patient underwent CT scan with contrast which showed no bowel obstruction but was significant for cirrhotic appearing liver and innumerable small nodules concerning for [address] as well as suspected nonocclusive thrombus in portal vein. Patient states that he never had any prior liver issues, he does not drink or smoke or does illicit drugs. He is not sure if he ever had any hepatitis. Past Medical History:        Diagnosis Date    Cancer ([address]     throat 1995    COPD (chronic obstructive pulmonary disease) ([address]        Past Surgical History:    History reviewed. No pertinent surgical history. Medications Prior to Admission:    Prior to Admission medications    Medication Sig Start Date End Date Taking?  Authorizing Provider   VALSARTAN PO Take by mouth   Yes Historical Provider, MD   albuterol sulfate HFA (PROVENTIL;VENTOLIN;PROAIR) 108 (90 Base) MCG/ACT inhaler Inhale 2 puffs into the lungs every 6 hours as needed 9/4/09  Yes Historical Provider, MD   apixaban (ELIQUIS) 5 MG TABS tablet Take 5 mg by mouth 2 times daily 10/27/22  Yes Historical Provider, MD   aspirin 81 MG chewable tablet Take 81 mg by mouth daily 5/13/20  Yes Historical Provider, MD   atorvastatin (LIPITOR) 20 MG tablet Take 20 mg by mouth daily 5/13/22  Yes Historical Provider, MD   Multiple Vitamins-Minerals (THERAPEUTIC MULTIVITAMIN-MINERALS) tablet Take 1 tablet by mouth daily   Yes Historical Provider, MD   Ascorbic Acid (VITAMIN C) 250 MG tablet Take 250 mg by mouth daily   Yes Historical Provider, MD   vitamin B-6 (PYRIDOXINE) 100 MG tablet Take 100 mg by mouth daily   Yes Historical Provider, MD   levothyroxine (SYNTHROID) 125 MCG tablet  10/26/22   Historical Provider, MD   valsartan (DIOVAN) 40 MG tablet  1/16/23   Historical Provider, MD   benzocaine (BABY ORAJEL) 7.5 % oral gel Apply 3-4 drops in affected ear every 3 hours as needed for ear pain. May sub 5-20% gel/liquid  Patient not taking: Reported on 1/17/2023 4/8/20   Pastora Godoy MD   tiotropium (SPIRIVA) 18 MCG inhalation capsule Inhale 18 mcg into the lungs daily    Historical Provider, MD   levothyroxine (SYNTHROID) 150 MCG tablet Take 150 mcg by mouth Daily  Patient not taking: No sig reported    Historical Provider, MD   amLODIPine (NORVASC) 5 MG tablet Take 5 mg by mouth daily  Patient not taking: Reported on 1/17/2023    Historical Provider, MD       Allergies:  Penicillins    Social History:  The patient currently lives at home    TOBACCO:   reports that he quit smoking about 17 years ago. His smoking use included cigarettes. He does not have any smokeless tobacco history on file. ETOH:   reports no history of alcohol use. Family History:  Reviewed in detail and Positive as follows:    History reviewed. No pertinent family history. REVIEW OF SYSTEMS:   Positive and negative as noted in the HPI. All other systems reviewed and negative.     PHYSICAL EXAM:    BP (!) 154/95   Pulse 98   Temp 98 °F (36.7 °C) (Oral)   Resp 18   Ht 5' 11\" (1.803 m)   Wt 222 lb 10.6 oz (101 kg)   SpO2 97%   BMI 31.06 kg/m²     General appearance: No apparent distress appears stated age and cooperative. HEENT Normal cephalic, atraumatic without obvious deformity. Pupils equal, round, and reactive to light. Extra ocular muscles intact. Conjunctivae/corneas clear. Neck: Supple, No jugular venous distention/bruits. Trachea midline without thyromegaly or adenopathy with full range of motion. Lungs: Clear to auscultation, bilaterally without Rales/Wheezes/Rhonchi with good respiratory effort. Heart: Regular rate and rhythm with Normal S1/S2 without murmurs, rubs or gallops, point of maximum impulse non-displaced  Abdomen: Soft, non-tender, moderately distended with diminished bowel sounds, no rigidity or guarding  Extremities: No clubbing, cyanosis, or edema bilaterally. Skin: Skin color, texture, turgor normal.    Neurologic: Alert and oriented X 3, grossly non-focal.  Mental status: Alert, oriented, thought content appropriate. Capillary refill is brisk  Peripheral pulses 2+    CT abdomen pelvis:  Reviewed abdominal CT with notable innumerable nodules in the liver, trace ascites present      The following labs reviewed personally:   CBC   Recent Labs     01/17/23  1220   WBC 4.2   HGB 14.8   HCT 44.9         RENAL  Recent Labs     01/17/23  1220   *   K 4.3      CO2 22   BUN 9   CREATININE 0.8     LFT'S  Recent Labs     01/17/23  1220   *   *   BILITOT 2.3*   ALKPHOS 348*     COAG  No results for input(s): INR in the last 72 hours.   CARDIAC ENZYMES  Recent Labs     01/17/23  1220   TROPONINI <0.01       U/A:    Lab Results   Component Value Date/Time    COLORU Yellow 01/17/2023 12:20 PM    WBCUA 0-2 06/03/2017 10:52 PM    RBCUA 3-5 06/03/2017 10:52 PM    MUCUS Rare 06/03/2017 10:52 PM    BACTERIA Rare 06/03/2017 10:52 PM    CLARITYU Clear 01/17/2023 12:20 PM    SPECGRAV <=1.005 01/17/2023 12:20 PM    LEUKOCYTESUR Negative 01/17/2023 12:20 PM    BLOODU Negative 01/17/2023 12:20 PM    GLUCOSEU Negative 01/17/2023 12:20 PM       ABG  No results found for: MWI7YML, BEART, B7HSPSXS, PHART, THGBART, KVR0TDZ, PO2ART, LMA4MBQ        Active Hospital Problems    Diagnosis Date Noted    History of liver failure [Z87.19] 01/17/2023     Priority: Medium    Liver mass [R16.0] 01/17/2023     Priority: Medium         ASSESSMENT/PLAN:    New diagnosis of liver cirrhosis, elevated liver enzymes with innumerable nodularity/masses on CT:  Will obtain liver protocol MRI with and without contrast, check AFP, hepatitis panel  Trend liver enzymes serially  Consult gastroenterology    Constipation:  Failed multiple laxatives as outpatient  Will try lactulose    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 If you have any questions or concerns please feel free to contact me at [phone].

## 2023-01-18 NOTE — PROGRESS NOTES
Admitted patient to room 6611 from [facility] ED with dx: Liver Lesion. Arrived to unit via stretcher with all belongings. No family present at this time. VS T98, P98, R18, /95, SPO2 98 RA    Isolation:  No active isolations     GEN: Patient is alert & oriented, speech clear and appropriate. Pain: C/O ongoing, intermittent right flank pain 3/10. States that pain is tolerable, declines pain medication at this time. IV:    Per report patient has an Ultrasound guided IV site RA that is clean dry and intact without redness or pain. CV: HRRRR. Has a pacemaker. No edema. Palpable pulses. Lungs: Respirations easy, unlabored without cough. On Room Air, denies SOB. Lungs clear. Encouraged C&DB. GI/: No complaints of nausea/vomiting/diarrhea. States that his appetite has been limited by the bloating. Gave sandwich. Tolerating diet. Abdomen soft, non tender, with bowel sounds. Continent of bowel and bladder. Last BM 7/17/2023. Urine output clear yellow, denies dysuria. Skin: Warm and dry. Mobility: Up independently as tolerated. Does not use assistive devices. .      Safety: Oriented to room, call light, tv, phone and dietary services. Bed in lowest position and locked. Non slip socks on. ID bracelet on and correct per patient verbally reporting name and date of birth. Plan of care and safety measures reviewed with the patient. Call light and needed items in reach. Disposition: plan for MRI tomorrow. And consult GI.

## 2023-01-19 ENCOUNTER — ANESTHESIA EVENT (OUTPATIENT)
Dept: ENDOSCOPY | Age: 78
End: 2023-01-19
Payer: MEDICARE

## 2023-01-19 ENCOUNTER — APPOINTMENT (OUTPATIENT)
Dept: MRI IMAGING | Age: 78
DRG: 432 | End: 2023-01-19
Payer: MEDICARE

## 2023-01-19 ENCOUNTER — ANESTHESIA (OUTPATIENT)
Dept: ENDOSCOPY | Age: 78
End: 2023-01-19
Payer: MEDICARE

## 2023-01-19 LAB
ALBUMIN SERPL-MCNC: 3 G/DL (ref 3.4–5)
ALP BLD-CCNC: 301 U/L (ref 40–129)
ALT SERPL-CCNC: 119 U/L (ref 10–40)
ANION GAP SERPL CALCULATED.3IONS-SCNC: 12 MMOL/L (ref 3–16)
AST SERPL-CCNC: 454 U/L (ref 15–37)
BASOPHILS ABSOLUTE: 0 K/UL (ref 0–0.2)
BASOPHILS RELATIVE PERCENT: 0.7 %
BILIRUB SERPL-MCNC: 2.6 MG/DL (ref 0–1)
BILIRUBIN DIRECT: 1.5 MG/DL (ref 0–0.3)
BILIRUBIN, INDIRECT: 1.1 MG/DL (ref 0–1)
BUN BLDV-MCNC: 11 MG/DL (ref 7–20)
CALCIUM SERPL-MCNC: 8.7 MG/DL (ref 8.3–10.6)
CHLORIDE BLD-SCNC: 104 MMOL/L (ref 99–110)
CO2: 22 MMOL/L (ref 21–32)
CREAT SERPL-MCNC: 0.8 MG/DL (ref 0.8–1.3)
EOSINOPHILS ABSOLUTE: 0.1 K/UL (ref 0–0.6)
EOSINOPHILS RELATIVE PERCENT: 2.9 %
GFR SERPL CREATININE-BSD FRML MDRD: >60 ML/MIN/{1.73_M2}
GLUCOSE BLD-MCNC: 70 MG/DL (ref 70–99)
HAV IGM SER IA-ACNC: ABNORMAL
HCT VFR BLD CALC: 45.1 % (ref 40.5–52.5)
HEMOGLOBIN: 14.3 G/DL (ref 13.5–17.5)
HEPATITIS B CORE IGM ANTIBODY: ABNORMAL
HEPATITIS B SURFACE ANTIGEN INTERPRETATION: ABNORMAL
HEPATITIS C ANTIBODY INTERPRETATION: REACTIVE
LYMPHOCYTES ABSOLUTE: 0.9 K/UL (ref 1–5.1)
LYMPHOCYTES RELATIVE PERCENT: 19.1 %
MCH RBC QN AUTO: 28.7 PG (ref 26–34)
MCHC RBC AUTO-ENTMCNC: 31.6 G/DL (ref 31–36)
MCV RBC AUTO: 90.7 FL (ref 80–100)
MONOCYTES ABSOLUTE: 0.7 K/UL (ref 0–1.3)
MONOCYTES RELATIVE PERCENT: 14.6 %
NEUTROPHILS ABSOLUTE: 3 K/UL (ref 1.7–7.7)
NEUTROPHILS RELATIVE PERCENT: 62.7 %
PDW BLD-RTO: 19.4 % (ref 12.4–15.4)
PHOSPHORUS: 3.1 MG/DL (ref 2.5–4.9)
PLATELET # BLD: 188 K/UL (ref 135–450)
PMV BLD AUTO: 8.6 FL (ref 5–10.5)
POTASSIUM SERPL-SCNC: 4.2 MMOL/L (ref 3.5–5.1)
RBC # BLD: 4.97 M/UL (ref 4.2–5.9)
SODIUM BLD-SCNC: 138 MMOL/L (ref 136–145)
TOTAL PROTEIN: 7 G/DL (ref 6.4–8.2)
WBC # BLD: 4.8 K/UL (ref 4–11)

## 2023-01-19 PROCEDURE — 2709999900 HC NON-CHARGEABLE SUPPLY: Performed by: INTERNAL MEDICINE

## 2023-01-19 PROCEDURE — 2500000003 HC RX 250 WO HCPCS

## 2023-01-19 PROCEDURE — 3609012300 HC EGD BAND LIGATION ESOPHGEAL/GASTRIC VARICES: Performed by: INTERNAL MEDICINE

## 2023-01-19 PROCEDURE — 0DBH8ZX EXCISION OF CECUM, VIA NATURAL OR ARTIFICIAL OPENING ENDOSCOPIC, DIAGNOSTIC: ICD-10-PCS | Performed by: INTERNAL MEDICINE

## 2023-01-19 PROCEDURE — 2500000003 HC RX 250 WO HCPCS: Performed by: INTERNAL MEDICINE

## 2023-01-19 PROCEDURE — 7100000010 HC PHASE II RECOVERY - FIRST 15 MIN: Performed by: INTERNAL MEDICINE

## 2023-01-19 PROCEDURE — 6370000000 HC RX 637 (ALT 250 FOR IP): Performed by: INTERNAL MEDICINE

## 2023-01-19 PROCEDURE — A9581 GADOXETATE DISODIUM INJ: HCPCS | Performed by: INTERNAL MEDICINE

## 2023-01-19 PROCEDURE — 1200000000 HC SEMI PRIVATE

## 2023-01-19 PROCEDURE — 2580000003 HC RX 258: Performed by: INTERNAL MEDICINE

## 2023-01-19 PROCEDURE — 3609010600 HC COLONOSCOPY POLYPECTOMY SNARE/COLD BIOPSY: Performed by: INTERNAL MEDICINE

## 2023-01-19 PROCEDURE — 7100000011 HC PHASE II RECOVERY - ADDTL 15 MIN: Performed by: INTERNAL MEDICINE

## 2023-01-19 PROCEDURE — 3700000001 HC ADD 15 MINUTES (ANESTHESIA): Performed by: INTERNAL MEDICINE

## 2023-01-19 PROCEDURE — 36415 COLL VENOUS BLD VENIPUNCTURE: CPT

## 2023-01-19 PROCEDURE — 85025 COMPLETE CBC W/AUTO DIFF WBC: CPT

## 2023-01-19 PROCEDURE — 06L38CZ OCCLUSION OF ESOPHAGEAL VEIN WITH EXTRALUMINAL DEVICE, VIA NATURAL OR ARTIFICIAL OPENING ENDOSCOPIC: ICD-10-PCS | Performed by: INTERNAL MEDICINE

## 2023-01-19 PROCEDURE — 6360000002 HC RX W HCPCS

## 2023-01-19 PROCEDURE — 0DBL8ZX EXCISION OF TRANSVERSE COLON, VIA NATURAL OR ARTIFICIAL OPENING ENDOSCOPIC, DIAGNOSTIC: ICD-10-PCS | Performed by: INTERNAL MEDICINE

## 2023-01-19 PROCEDURE — 80076 HEPATIC FUNCTION PANEL: CPT

## 2023-01-19 PROCEDURE — 88305 TISSUE EXAM BY PATHOLOGIST: CPT

## 2023-01-19 PROCEDURE — 3700000000 HC ANESTHESIA ATTENDED CARE: Performed by: INTERNAL MEDICINE

## 2023-01-19 PROCEDURE — 6360000004 HC RX CONTRAST MEDICATION: Performed by: INTERNAL MEDICINE

## 2023-01-19 PROCEDURE — 80069 RENAL FUNCTION PANEL: CPT

## 2023-01-19 PROCEDURE — 74183 MRI ABD W/O CNTR FLWD CNTR: CPT

## 2023-01-19 PROCEDURE — 99223 1ST HOSP IP/OBS HIGH 75: CPT | Performed by: SURGERY

## 2023-01-19 RX ORDER — LABETALOL HYDROCHLORIDE 5 MG/ML
10 INJECTION, SOLUTION INTRAVENOUS ONCE
Status: COMPLETED | OUTPATIENT
Start: 2023-01-19 | End: 2023-01-19

## 2023-01-19 RX ORDER — PROPOFOL 10 MG/ML
INJECTION, EMULSION INTRAVENOUS CONTINUOUS PRN
Status: DISCONTINUED | OUTPATIENT
Start: 2023-01-19 | End: 2023-01-19 | Stop reason: SDUPTHER

## 2023-01-19 RX ORDER — PROPOFOL 10 MG/ML
INJECTION, EMULSION INTRAVENOUS PRN
Status: DISCONTINUED | OUTPATIENT
Start: 2023-01-19 | End: 2023-01-19 | Stop reason: SDUPTHER

## 2023-01-19 RX ORDER — LEVOTHYROXINE SODIUM 0.12 MG/1
125 TABLET ORAL DAILY
Status: DISCONTINUED | OUTPATIENT
Start: 2023-01-19 | End: 2023-01-23 | Stop reason: HOSPADM

## 2023-01-19 RX ORDER — OXYCODONE HYDROCHLORIDE 5 MG/1
5 TABLET ORAL ONCE
Status: COMPLETED | OUTPATIENT
Start: 2023-01-19 | End: 2023-01-20

## 2023-01-19 RX ORDER — LEVOTHYROXINE SODIUM 137 UG/1
137 TABLET ORAL DAILY
Status: DISCONTINUED | OUTPATIENT
Start: 2023-01-19 | End: 2023-01-19

## 2023-01-19 RX ORDER — SODIUM CHLORIDE 9 MG/ML
INJECTION, SOLUTION INTRAVENOUS ONCE
Status: COMPLETED | OUTPATIENT
Start: 2023-01-19 | End: 2023-01-19

## 2023-01-19 RX ORDER — LIDOCAINE HYDROCHLORIDE 20 MG/ML
INJECTION, SOLUTION EPIDURAL; INFILTRATION; INTRACAUDAL; PERINEURAL PRN
Status: DISCONTINUED | OUTPATIENT
Start: 2023-01-19 | End: 2023-01-19 | Stop reason: SDUPTHER

## 2023-01-19 RX ADMIN — ACETAMINOPHEN 650 MG: 325 TABLET ORAL at 20:56

## 2023-01-19 RX ADMIN — SODIUM CHLORIDE: 9 INJECTION, SOLUTION INTRAVENOUS at 14:06

## 2023-01-19 RX ADMIN — Medication 100 MG: at 08:36

## 2023-01-19 RX ADMIN — PROPOFOL 100 MCG/KG/MIN: 10 INJECTION, EMULSION INTRAVENOUS at 14:52

## 2023-01-19 RX ADMIN — ALUMINUM HYDROXIDE, MAGNESIUM HYDROXIDE, AND SIMETHICONE: 200; 200; 20 SUSPENSION ORAL at 15:53

## 2023-01-19 RX ADMIN — LEVOTHYROXINE SODIUM 125 MCG: 0.12 TABLET ORAL at 08:36

## 2023-01-19 RX ADMIN — PROPOFOL 50 MG: 10 INJECTION, EMULSION INTRAVENOUS at 14:52

## 2023-01-19 RX ADMIN — LIDOCAINE HYDROCHLORIDE 100 MG: 20 INJECTION, SOLUTION EPIDURAL; INFILTRATION; INTRACAUDAL; PERINEURAL at 14:52

## 2023-01-19 RX ADMIN — LABETALOL HYDROCHLORIDE 10 MG: 5 INJECTION INTRAVENOUS at 18:24

## 2023-01-19 RX ADMIN — VALSARTAN 40 MG: 80 TABLET, FILM COATED ORAL at 08:36

## 2023-01-19 RX ADMIN — PHENYLEPHRINE HYDROCHLORIDE 100 MCG: 10 INJECTION, SOLUTION INTRAMUSCULAR; INTRAVENOUS; SUBCUTANEOUS at 15:03

## 2023-01-19 RX ADMIN — ASPIRIN 81 MG: 81 TABLET, CHEWABLE ORAL at 08:36

## 2023-01-19 RX ADMIN — GADOXETATE DISODIUM 10 ML: 181.43 INJECTION, SOLUTION INTRAVENOUS at 12:50

## 2023-01-19 RX ADMIN — SODIUM CHLORIDE, PRESERVATIVE FREE 10 ML: 5 INJECTION INTRAVENOUS at 08:35

## 2023-01-19 RX ADMIN — APIXABAN 5 MG: 5 TABLET, FILM COATED ORAL at 20:57

## 2023-01-19 RX ADMIN — SODIUM CHLORIDE: 9 INJECTION, SOLUTION INTRAVENOUS at 14:50

## 2023-01-19 RX ADMIN — PROPOFOL 50 MG: 10 INJECTION, EMULSION INTRAVENOUS at 14:56

## 2023-01-19 RX ADMIN — OXYCODONE HYDROCHLORIDE AND ACETAMINOPHEN 250 MG: 500 TABLET ORAL at 08:36

## 2023-01-19 ASSESSMENT — PAIN SCALES - GENERAL
PAINLEVEL_OUTOF10: 5
PAINLEVEL_OUTOF10: 8
PAINLEVEL_OUTOF10: 8

## 2023-01-19 ASSESSMENT — PAIN - FUNCTIONAL ASSESSMENT
PAIN_FUNCTIONAL_ASSESSMENT: NONE - DENIES PAIN
PAIN_FUNCTIONAL_ASSESSMENT: ACTIVITIES ARE NOT PREVENTED

## 2023-01-19 ASSESSMENT — PAIN DESCRIPTION - LOCATION
LOCATION: ABDOMEN
LOCATION: ABDOMEN

## 2023-01-19 NOTE — CONSULTS
Oncological Surgery   Resident Consult Note    Reason for Consult: Suspicion for Hepatocellular Carcinoma    History of Present Illness:   Lizbeth Boswell is a 69 y.o. 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 Pt indicates he has had several weeks of abdominal pain that has been accompanied with infrequent bowel movements, decrease in appetite and weight loss of 25 lbs over these last several weeks. Pt initially presented to [facility] ED on 1/17 where his workup showed elevated bilirubin, transaminitis, and CT A&P concerning for liver cirrhosis and hepatocellular carcinoma. He was then transferred to [facility] for further management. Past Medical History:        Diagnosis Date    Cancer ([facility] [address]     throat 1995    COPD (chronic obstructive pulmonary disease) ([facility] [address]     Encounter for imaging to screen for metal prior to MRI 01/18/2023    MRI Conditional Pacemaker Biotronik Edora 8 HF-T QP model#396220 Leads: -177, -179, LV [id number]. Normal Mode. [employer] Rep and RN must be present. Pt must be A/OX4  per [employer] guidelines. Follow all other [employer] guidelines. Pt currrently follows  at [facility]       Past Surgical History:    History reviewed. No pertinent surgical history. Allergies:  Penicillins    Medications:   Home Meds  No current facility-administered medications on file prior to encounter.      Current Outpatient Medications on File Prior to Encounter   Medication Sig Dispense Refill    mupirocin (BACTROBAN NASAL) 2 % nasal ointment by Nasal route 2 times daily      sodium chloride (OCEAN, BABY AYR) 0.65 % nasal spray 1 spray by Nasal route as needed for Congestion      VALSARTAN PO Take by mouth      albuterol sulfate HFA (PROVENTIL;VENTOLIN;PROAIR) 108 (90 Base) MCG/ACT inhaler Inhale 2 puffs into the lungs every 6 hours as needed      apixaban (ELIQUIS) 5 MG TABS tablet Take 5 mg by mouth 2 times daily      aspirin 81 MG chewable tablet Take 81 mg by mouth daily      atorvastatin (LIPITOR) 20 MG tablet Take 20 mg by mouth daily      Multiple Vitamins-Minerals (THERAPEUTIC MULTIVITAMIN-MINERALS) tablet Take 1 tablet by mouth daily      Ascorbic Acid (VITAMIN C) 250 MG tablet Take 250 mg by mouth daily      vitamin B-6 (PYRIDOXINE) 100 MG tablet Take 100 mg by mouth daily      levothyroxine (SYNTHROID) 125 MCG tablet  (Patient not taking: No sig reported)      valsartan (DIOVAN) 40 MG tablet       benzocaine (BABY ORAJEL) 7.5 % oral gel Apply 3-4 drops in affected ear every 3 hours as needed for ear pain.   May sub 5-20% gel/liquid (Patient not taking: Reported on 1/17/2023) 1 Tube 0    tiotropium (SPIRIVA) 18 MCG inhalation capsule Inhale 18 mcg into the lungs daily      levothyroxine (SYNTHROID) 150 MCG tablet Take 150 mcg by mouth Daily (Patient not taking: No sig reported)      amLODIPine (NORVASC) 5 MG tablet Take 5 mg by mouth daily (Patient not taking: Reported on 1/17/2023)         Current Meds  levothyroxine (SYNTHROID) tablet 125 mcg, Daily  sodium chloride (OCEAN, BABY AYR) 0.65 % nasal spray 1 spray, PRN  mupirocin (BACTROBAN) 2 % ointment, Daily  polyethylene glycol (GoLYTELY) solution 2,000 mL, Once  sodium chloride flush 0.9 % injection 5-40 mL, 2 times per day  sodium chloride flush 0.9 % injection 5-40 mL, PRN  0.9 % sodium chloride infusion, PRN  ondansetron (ZOFRAN-ODT) disintegrating tablet 4 mg, Q8H PRN   Or  ondansetron (ZOFRAN) injection 4 mg, Q6H PRN  polyethylene glycol (GLYCOLAX) packet 17 g, Daily PRN  acetaminophen (TYLENOL) tablet 650 mg, Q6H PRN   Or  acetaminophen (TYLENOL) suppository 650 mg, Q6H PRN  lactulose (CHRONULAC) 10 GM/15ML solution 20 g, Daily  albuterol (PROVENTIL) nebulizer solution 2.5 mg, Q4H PRN  apixaban (ELIQUIS) tablet 5 mg, BID  ascorbic acid (VITAMIN C) tablet 250 mg, Daily  aspirin chewable tablet 81 mg, Daily  vitamin B-6 (PYRIDOXINE) tablet 100 mg, Daily  valsartan (DIOVAN) tablet 40 mg, Daily      Family History:   History reviewed. No pertinent family history. Social History:   TOBACCO:   reports that he quit smoking about 17 years ago. His smoking use included cigarettes. He does not have any smokeless tobacco history on file. ETOH:   reports no history of alcohol use. DRUGS:   has no history on file for drug use. Review of Systems:   A 14 point review of systems was conducted, significant findings as noted in HPI. All other systems negative. Physical exam:    Vitals:    01/19/23 0340 01/19/23 0732 01/19/23 1314 01/19/23 1526   BP: (!) 135/95 (!) 150/69 (!) 156/97 (!) 148/85   Pulse: 97 90 (!) 102 96   Resp: 18 18 18 16   Temp: 99.4 °F (37.4 °C) 98.7 °F (37.1 °C) 97.3 °F (36.3 °C) 97.3 °F (36.3 °C)   TempSrc: Oral Oral Temporal Temporal   SpO2: 96% 94% 95% 100%   Weight:       Height:           General appearance: no acute distress, ill appearing  Eyes: Scleral icterus present, EOM grossly intact  Neck: trachea midline, no JVD  Chest/Lungs: normal effort with no accessory muscle use on RA  Cardiovascular: RRR  Abdomen: Distended, moderate tenderness, no rebound, guarding, or rigidity. Well healed incision in the LUQ.    Skin: warm and dry, no rashes  Extremities: no edema, no cyanosis  Neuro/psych: A&Ox3, no focal deficits, sensation intact    Labs:    CBC:   Recent Labs     01/18/23  0612 01/18/23  1610 01/19/23  0658   WBC 4.3 4.3 4.8   HGB 14.2 14.0 14.3   HCT 42.5 42.4 45.1   MCV 87.3 86.9 90.7    178 188     BMP:   Recent Labs     01/17/23  1220 01/18/23  0612 01/19/23  0658   * 134* 138   K 4.3 4.3 4.2    101 104   CO2 22 21 22   PHOS  --  2.7 3.1   BUN 9 8 11   CREATININE 0.8 0.6* 0.8     PT/INR:   Recent Labs     01/18/23  0612 01/18/23  1610 PROTIME 17.5* 18.0*   INR 1.44* 1.49*     APTT: No results for input(s): APTT in the last 72 hours. Liver Profile:   Lab Results   Component Value Date/Time     01/19/2023 06:58 AM     01/19/2023 06:58 AM    BILIDIR 1.5 01/19/2023 06:58 AM    BILITOT 2.6 01/19/2023 06:58 AM    ALKPHOS 944 01/19/2023 06:58 AM   No results found for: CHOL, HDL, TRIG  UA:   Lab Results   Component Value Date/Time    COLORU Yellow 01/17/2023 12:20 PM    PHUR 6.5 01/17/2023 12:20 PM    WBCUA 0-2 06/03/2017 10:52 PM    RBCUA 3-5 06/03/2017 10:52 PM    MUCUS Rare 06/03/2017 10:52 PM    BACTERIA Rare 06/03/2017 10:52 PM    CLARITYU Clear 01/17/2023 12:20 PM    SPECGRAV <=1.005 01/17/2023 12:20 PM    LEUKOCYTESUR Negative 01/17/2023 12:20 PM    UROBILINOGEN 2.0 01/17/2023 12:20 PM    BILIRUBINUR Negative 01/17/2023 12:20 PM    BLOODU Negative 01/17/2023 12:20 PM    GLUCOSEU Negative 01/17/2023 12:20 PM       Imaging:   MRI ABDOMEN W WO CONTRAST   Final Result      1. Cirrhotic liver morphology with extensive heterogeneity and a large area of contrast washout on the right hepatic lobe measuring 9.5 cm, and numerous additional nodular areas of washout throughout the remainder of the liver most concerning for    multifocal HCC. 2.  Expansile thrombus within the main, right, and left portal veins concerning for tumor thrombus. 3.  Nonspecific mildly enlarged periportal lymph nodes. CT ABDOMEN PELVIS W IV CONTRAST Additional Contrast? None   Final Result      1. Cirrhotic liver. 2. Innumerable small hypoattenuating nodules mostly in the right hepatic lobe, nonspecific/indeterminate, may relate to degenerative nodules, dysplastic nodules or HCC. Recommended follow-up with contrast-enhanced MRI using liver protocol. 3. Suspected nonocclusive thrombus in portal vein. 4. Mildly enlarged periportal lymph nodes. 5. Trace ascites.    6. Mild wall thickening of cecum/ascending colon with adjacent fat stranding likely relate to portal colonopathy. Assessment/Plan:  Branden Ibrahim is a 69 y.o. 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 MRI today further demonstrates the innumerable masses concerning for hepatocellular carcinoma with likely tumor thromboses in the main, right, and left portal veins. Finally, pt underwent EGD today in which esophageal varices and severe portal hypertensive gastropathy were demonstrated. - F/u AFP levels  - CT Chest to complete staging  - Consult to medical oncology. Mateusz Pollard DO   PGY1, General Surgery  01/19/23  4:28 PM  Contact via [employer]    I saw and independently examined the patient today. I agree with the history of present illness, past medical/surgical histories, family history, social history, medication list and allergies as listed. The review of systems is as noted above. My physical exam confirms the findings listed above. Review of labs, pathology reports, radiology reports and medical records confirm the findings noted above. I edited the note where appropriate.     Likely HCC with portal vein thrombus and cirrhosis  Explained about diagnosis, further work up and treatment  Overall poor prognosis  At this point his options are systemic therapy vs evaluating for liver directed therapy - will discuss in multidisciplinary conference if he wants to keep care here  Pain control  Discussed with GI and hospitalist    Alie Swan MD  Surgery Attending

## 2023-01-19 NOTE — ANESTHESIA PRE PROCEDURE
Department of Anesthesiology  Preprocedure Note       Name:  Maya Pierce   Age:  69 y.o.  :  1945                                          MRN:  [id number]         Date:  2023      Surgeon: Bakari Silva):  Shade Cote MD    Procedure: Procedure(s):  EGD DIAGNOSTIC ONLY  COLONOSCOPY DIAGNOSTIC    Medications prior to admission:   Prior to Admission medications    Medication Sig Start Date End Date Taking? Authorizing Provider   mupirocin (BACTROBAN NASAL) 2 % nasal ointment by Nasal route 2 times daily   Yes Historical Provider, MD   sodium chloride (OCEAN, BABY AYR) 0.65 % nasal spray 1 spray by Nasal route as needed for Congestion   Yes Historical Provider, MD   VALSARTAN PO Take by mouth   Yes Historical Provider, MD   albuterol sulfate HFA (PROVENTIL;VENTOLIN;PROAIR) 108 (90 Base) MCG/ACT inhaler Inhale 2 puffs into the lungs every 6 hours as needed 09  Yes Historical Provider, MD   apixaban (ELIQUIS) 5 MG TABS tablet Take 5 mg by mouth 2 times daily 10/27/22  Yes Historical Provider, MD   aspirin 81 MG chewable tablet Take 81 mg by mouth daily 20  Yes Historical Provider, MD   atorvastatin (LIPITOR) 20 MG tablet Take 20 mg by mouth daily 22  Yes Historical Provider, MD   Multiple Vitamins-Minerals (THERAPEUTIC MULTIVITAMIN-MINERALS) tablet Take 1 tablet by mouth daily   Yes Historical Provider, MD   Ascorbic Acid (VITAMIN C) 250 MG tablet Take 250 mg by mouth daily   Yes Historical Provider, MD   vitamin B-6 (PYRIDOXINE) 100 MG tablet Take 100 mg by mouth daily   Yes Historical Provider, MD   levothyroxine (SYNTHROID) 125 MCG tablet  10/26/22   Historical Provider, MD   valsartan (DIOVAN) 40 MG tablet  23   Historical Provider, MD   benzocaine (BABY ORAJEL) 7.5 % oral gel Apply 3-4 drops in affected ear every 3 hours as needed for ear pain.   May sub 5-20% gel/liquid  Patient not taking: Reported on 2023   Jimmie Mckeon MD   tiotropium (SPIRIVA) 18 MCG inhalation capsule Inhale 18 mcg into the lungs daily    Historical Provider, MD   levothyroxine (SYNTHROID) 150 MCG tablet Take 150 mcg by mouth Daily  Patient not taking: No sig reported    Historical Provider, MD   amLODIPine (NORVASC) 5 MG tablet Take 5 mg by mouth daily  Patient not taking: Reported on 1/17/2023    Historical Provider, MD       Current medications:    Current Facility-Administered Medications   Medication Dose Route Frequency Provider Last Rate Last Admin    levothyroxine (SYNTHROID) tablet 125 mcg  125 mcg Oral Daily Irais Cummins MD   125 mcg at 01/19/23 0836    sodium chloride (OCEAN, BABY AYR) 0.65 % nasal spray 1 spray  1 spray Each Nostril PRN Spenser Chicas MD        mupirocin (BACTROBAN) 2 % ointment   Nasal Daily Yana Thao DO   Given at 01/18/23 1721    polyethylene glycol (GoLYTELY) solution 2,000 mL  2,000 mL Oral Once Misty Rosario MD        sodium chloride flush 0.9 % injection 5-40 mL  5-40 mL IntraVENous 2 times per day Spenser Chicas MD   10 mL at 01/19/23 0835    sodium chloride flush 0.9 % injection 5-40 mL  5-40 mL IntraVENous PRN Spenser Chicas MD        0.9 % sodium chloride infusion   IntraVENous PRN Spenser Chicas MD        ondansetron (ZOFRAN-ODT) disintegrating tablet 4 mg  4 mg Oral Q8H PRN Spenser Chicas MD        Or    ondansetron [facility]) injection 4 mg  4 mg IntraVENous Q6H PRN Spenser Chicas MD        polyethylene glycol (GLYCOLAX) packet 17 g  17 g Oral Daily PRN Spenser Chicas MD        acetaminophen (TYLENOL) tablet 650 mg  650 mg Oral Q6H PRN Spenser Chicas MD        Or    acetaminophen (TYLENOL) suppository 650 mg  650 mg Rectal Q6H PRN Spenser Chicas MD        lactulose (CHRONULAC) 10 GM/15ML solution 20 g  20 g Oral Daily Spenser Chicas MD   20 g at 01/18/23 0851    albuterol (PROVENTIL) nebulizer solution 2.5 mg  2.5 mg Nebulization Q4H PRN Spenser Chicas MD        apixaban (ELIQUIS) tablet 5 mg  5 mg Oral BID Spenser Chicas MD   5 mg at 23 2140    ascorbic acid (VITAMIN C) tablet 250 mg  250 mg Oral Daily Mary Beth Alves MD   250 mg at 23 0836    aspirin chewable tablet 81 mg  81 mg Oral Daily Mary Beth Alves MD   81 mg at 23 0836    vitamin B-6 (PYRIDOXINE) tablet 100 mg  100 mg Oral Daily Mary Beth Alves MD   100 mg at 23 0836    valsartan (DIOVAN) tablet 40 mg  40 mg Oral Daily Cathy Mora MD   40 mg at 23 0836       Allergies: Allergies   Allergen Reactions    Penicillins        Problem List:    Patient Active Problem List   Diagnosis Code    History of liver failure Z87.19    Liver mass R16.0    Cirrhosis ([facility] [address] K74.60    Portal vein thrombosis I81    Hx of pulmonary embolus Z86.711    Constipation K59.00       Past Medical History:        Diagnosis Date    Cancer ([facility] [address]     throat     COPD (chronic obstructive pulmonary disease) ([facility] [address]     Encounter for imaging to screen for metal prior to MRI 2023    MRI Conditional Pacemaker [employer] Edora 8 HF-T QP model#841491 Leads: -177, -179, LV [id number]. Normal Mode. [employer] Rep and RN must be present. Pt must be A/OX4  per [employer] guidelines. Follow all other [employer] guidelines. Pt currrently follows  at [facility]       Past Surgical History:  History reviewed. No pertinent surgical history. Social History:    Social History     Tobacco Use    Smoking status: Former     Types: Cigarettes     Quit date: 6/3/2005     Years since quittin.6    Smokeless tobacco: Not on file   Substance Use Topics    Alcohol use:  No                                Counseling given: Not Answered      Vital Signs (Current):   Vitals:    23 0008 23 0340 23 0732 23 1314   BP: 138/82 (!) 135/95 (!) 150/69 (!) 156/97   Pulse: 100 97 90 (!) 102   Resp:    Temp: 98.8 °F (37.1 °C) 99.4 °F (37.4 °C) 98.7 °F (37.1 °C) 97.3 °F (36.3 °C)   TempSrc: Oral Oral Oral Temporal   SpO2: 96% 96% 94% 95%   Weight: Height:                                                  BP Readings from Last 3 Encounters:   01/19/23 (!) 156/97   04/08/20 136/86   06/03/17 121/82       NPO Status: Time of last liquid consumption: 0930                        Time of last solid consumption: 0000                        Date of last liquid consumption: 01/19/23                        Date of last solid food consumption: 01/17/23    BMI:   Wt Readings from Last 3 Encounters:   01/17/23 222 lb 10.6 oz (101 kg)   04/08/20 241 lb 7 oz (109.5 kg)   06/03/17 265 lb (120.2 kg)     Body mass index is 31.06 kg/m². CBC:   Lab Results   Component Value Date/Time    WBC 4.8 01/19/2023 06:58 AM    RBC 4.97 01/19/2023 06:58 AM    HGB 14.3 01/19/2023 06:58 AM    HCT 45.1 01/19/2023 06:58 AM    MCV 90.7 01/19/2023 06:58 AM    RDW 19.4 01/19/2023 06:58 AM     01/19/2023 06:58 AM       CMP:   Lab Results   Component Value Date/Time     01/19/2023 06:58 AM    K 4.2 01/19/2023 06:58 AM    K 4.3 01/17/2023 12:20 PM     01/19/2023 06:58 AM    CO2 22 01/19/2023 06:58 AM    BUN 11 01/19/2023 06:58 AM    CREATININE 0.8 01/19/2023 06:58 AM    GFRAA >60 06/03/2017 09:42 PM    AGRATIO 0.7 01/17/2023 12:20 PM    LABGLOM >60 01/19/2023 06:58 AM    GLUCOSE 70 01/19/2023 06:58 AM    PROT 7.0 01/19/2023 06:58 AM    CALCIUM 8.7 01/19/2023 06:58 AM    BILITOT 2.6 01/19/2023 06:58 AM    ALKPHOS 301 01/19/2023 06:58 AM     01/19/2023 06:58 AM     01/19/2023 06:58 AM       POC Tests: No results for input(s): POCGLU, POCNA, POCK, POCCL, POCBUN, POCHEMO, POCHCT in the last 72 hours.     Coags:   Lab Results   Component Value Date/Time    PROTIME 18.0 01/18/2023 04:10 PM    INR 1.49 01/18/2023 04:10 PM       HCG (If Applicable): No results found for: PREGTESTUR, PREGSERUM, HCG, HCGQUANT     ABGs: No results found for: PHART, PO2ART, NGW8YLH, UUU0OAG, BEART, Y4XJROWF     Type & Screen (If Applicable):  No results found for: LABABO, [address]    Drug/Infectious Status (If Applicable):  No results found for: HIV, HEPCAB    COVID-19 Screening (If Applicable): No results found for: COVID19        Anesthesia Evaluation    Airway: Mallampati: II  TM distance: >3 FB   Neck ROM: full  Mouth opening: > = 3 FB   Dental:          Pulmonary:   (+) COPD:                             Cardiovascular:            Rhythm: regular  Rate: normal                    Neuro/Psych:               GI/Hepatic/Renal:   (+) liver disease:,           Endo/Other:                     Abdominal:             Vascular: Other Findings:           Anesthesia Plan      MAC     ASA 3       Induction: intravenous. Anesthetic plan and risks discussed with patient. Plan discussed with CRNA.                     Laurent Ibarra MD   1/19/2023

## 2023-01-19 NOTE — PROGRESS NOTES
Patient's IV access infiltrated. .  Anesthesia dept attempted vein access several times. Patient tolerated well. No access successful at this time

## 2023-01-19 NOTE — CARE COORDINATION
CM  following  for  d/c  planning:    CM met with pt  at  bedside he states he is from home alone and  is  Indep with mobility and ADL's at baseline. He plans to return home  at  discharge and  does no  anticipate any  d/c  needs at this time. Patient  admitted  with  Cirrhosis: Liver  Lesion,  Abd pain and  bloating:  GI consulted:  NPO  MRI  abdomen . Patient  OFF UNIT  for Colonsocopy  and  MRI>       CM  will cont to follow  and  assist  with  d/c  planning . Electronically signed by Trinidad Ng RN on 1/19/2023 at 4:41 PM     Trinidad Ng RN Case Manager  [facility]  [address] [place] [address].   [facility] [address]  [phone]  Fax [phone]

## 2023-01-19 NOTE — OP NOTE
[address]  and [facility]  Esophagogastroduodenoscopy Note        Patient: Brigitte Espino  : 1945     Procedure: Esophagogastroduodenoscopy with variceal band ligation    Date:  2023     Anesthesia: MAC    Indications: Cirrhosis, variceal screening. Description of Procedure:  Informed consent was obtained from the patient after explanation of indications, benefits and possible risks and complications of the procedure. The procedure was done at bedside. Patient was placed in the left lateral decubitus position and the above IV sedation was administrered. The Olympus videoendoscope was placed in the patient's mouth and under direct visualization passed into the esophagus. The scope was then advanced into the stomach and then into the second portion of the duodenum. Esophagus showed large esophageal varices. 6 bands were placed using smart band band ligation device successfully. The stomach showed severe portal hypertensive gastropathy  No gastric varices noted  The duodenum showed no abnormalities    Retroflexed views of the cardia and fundus showed no other abnormalities. The scope was anteflexed and the stomach was decompressed, and the scope was completely withdrawn. The patient tolerated the procedure well.     EBL: None    Impression:    Esophageal varices, band ligated  Portal hypertensive gastropathy    Recommendations:     Proceed with colonoscopy    Charlette Gottron, MD, MD  [address] [address]

## 2023-01-19 NOTE — PROGRESS NOTES
Hospital Medicine Care  Progress Note      Chief complain; Bloated (Pt states he feels like he's bloated and has to belch. Symptoms x past month. States he feels like he cannot eat, nauseated. Small BMs. Has tried a bunch of stool softeners.)            Subjective:     Feels well  Constipation resolved. Review of Systems:     Review of Systems as mentioned above, other ros is negative. Objective:       Medications        Scheduled Meds:   levothyroxine  125 mcg Oral Daily    mupirocin   Nasal Daily    polyethylene glycol  2,000 mL Oral Once    sodium chloride flush  5-40 mL IntraVENous 2 times per day    lactulose  20 g Oral Daily    apixaban  5 mg Oral BID    vitamin C  250 mg Oral Daily    aspirin  81 mg Oral Daily    vitamin B-6  100 mg Oral Daily    valsartan  40 mg Oral Daily     Continuous Infusions:   sodium chloride       PRN Meds:.sodium chloride, sodium chloride flush, sodium chloride, ondansetron **OR** ondansetron, polyethylene glycol, acetaminophen **OR** acetaminophen, albuterol      Allergies  Allergies   Allergen Reactions    Penicillins          Vitals:    01/18/23 2130 01/19/23 0008 01/19/23 0340 01/19/23 0732   BP: (!) 148/98 138/82 (!) 135/95 (!) 150/69   Pulse: 98 100 97 90   Resp: 18 18 18 18   Temp: 99.8 °F (37.7 °C) 98.8 °F (37.1 °C) 99.4 °F (37.4 °C) 98.7 °F (37.1 °C)   TempSrc: Oral Oral Oral Oral   SpO2: 95% 96% 96% 94%   Weight:       Height:             Physical exam:         Physical Exam  Constitutional:       Appearance: Normal appearance. HENT:      Head: Normocephalic and atraumatic. Cardiovascular:      Rate and Rhythm: Normal rate and regular rhythm. Pulses: Normal pulses. Heart sounds: Normal heart sounds. Pulmonary:      Effort: Pulmonary effort is normal. No respiratory distress. Breath sounds: Normal breath sounds. No wheezing. Abdominal:      General: Abdomen is flat. Bowel sounds are normal. There is no distension.       Palpations: Abdomen is soft. Tenderness: There is no abdominal tenderness. Musculoskeletal:         General: No swelling or deformity. Normal range of motion. Skin:     General: Skin is warm and dry. Capillary Refill: Capillary refill takes less than 2 seconds. Neurological:      General: No focal deficit present. Mental Status: He is alert and oriented to person, place, and time. Psychiatric:         Mood and Affect: Mood normal.         Behavior: Behavior normal.             Labs      Recent Labs     01/18/23  0612 01/18/23  1610 01/19/23  0658   WBC 4.3 4.3 4.8   HGB 14.2 14.0 14.3   HCT 42.5 42.4 45.1    178 188   MCV 87.3 86.9 90.7          Recent Labs     01/17/23  1220 01/18/23  0612 01/19/23  0658   * 134* 138   K 4.3 4.3 4.2    101 104   CO2 22 21 22   PHOS  --  2.7 3.1   BUN 9 8 11   CREATININE 0.8 0.6* 0.8         Recent Labs     01/17/23  1220 01/18/23  0612 01/19/23  0658   * 509* 454*   * 139* 119*   BILIDIR  --  1.2* 1.5*   BILITOT 2.3* 2.2* 2.6*   ALKPHOS 348* 337* 301*         No results for input(s): MG in the last 72 hours. Radiology  CT ABDOMEN PELVIS W IV CONTRAST Additional Contrast? None   Final Result      1. Cirrhotic liver. 2. Innumerable small hypoattenuating nodules mostly in the right hepatic lobe, nonspecific/indeterminate, may relate to degenerative nodules, dysplastic nodules or HCC. Recommended follow-up with contrast-enhanced MRI using liver protocol. 3. Suspected nonocclusive thrombus in portal vein. 4. Mildly enlarged periportal lymph nodes. 5. Trace ascites. 6. Mild wall thickening of cecum/ascending colon with adjacent fat stranding likely relate to portal colonopathy.       MRI ABDOMEN W WO CONTRAST    (Results Pending)           Assessment and Plan:   Principal Problem:    Cirrhosis ([address]  Active Problems:    Liver mass    Portal vein thrombosis    Hx of pulmonary embolus    Constipation  Resolved Problems:    * No resolved hospital problems. *     Cirrhosis  New finding  Unclear etiology, denies alcohol use  Hepatitis panel: Hep C ab reactive. Will order anti-smooth muscle antibody  Ceruloplasmin pending. GI consulted  MRI abdomen ordered  EGD/Colon today per GI     Portal vein thrombosis  Continue with Eliquis    History of PE  Continue with Eliquis    Constipation  Resolved. Hypothyroidism  TFT reviewed. Increased T4, will reduce home synthroid dose  Discussed with patient on OP monitoring.                Petey Arenas MD  1/19/2023

## 2023-01-19 NOTE — OP NOTE
[address] [facility]  Colonoscopy Note            Patient: Kylah Marino  : 1945     Procedure: Colonoscopy with cold snare polypectomy    Date:  2023    Surgeon:  Giuseppe Lassiter MD, MD    Anesthesia:  MAC    Indications: Change in bowel habits, constipation    Procedure: An informed consent was obtained from the patient after explanation of indications, benefits, possible risks and complications of the procedure. The patient was then taken to the endoscopy suite, placed in the left lateral decubitus position, and the above IV anesthesia was administered. A digital rectal examination was performed and revealed negative without mass, lesions or tenderness. The Olympus CFQ-180-AL video colonoscope was placed in the patient's rectum under digital direction and advanced to the cecum. The cecum was identified by characteristic anatomy and ballottment. The prep was fair. The scope was then withdrawn back through the cecum, ascending, transverse, descending and sigmoid colons. The scope was then withdrawn into the rectum and retroflexed. The scope was straightened, the colon was decompressed and the scope was withdrawn from the patient. The patient tolerated the procedure well and was taken to the PACU in good condition. Findings:    Cecum: 3 mm sessile polyp removed using the cold snare  Ascending Colon: Normal  Transverse Colon: 4 mm sessile polyp removed using a cold snare  Descending Colon: Normal  Sigmoid Colon: Normal  Rectum: Normal    Estimated Blood Loss: None      Impression:    Polyps as described above  Otherwise normal colonoscopy    Recommendations: Follow biopsy results  Repeat Colonoscopy in 5 years.     Giuseppe Lassiter MD, MD   [address]  2023

## 2023-01-19 NOTE — ANESTHESIA POSTPROCEDURE EVALUATION
Department of Anesthesiology  Postprocedure Note    Patient: Cy Johnson  MRN: [id number]  YOB: 1945  Date of evaluation: 1/19/2023      Procedure Summary     Date: 01/19/23 Room / Location: [facility]    Anesthesia Start: 1343 Anesthesia Stop: 2086    Procedures:       EGD BAND LIGATION      COLONOSCOPY POLYPECTOMY SNARE/COLD BIOPSY Diagnosis:       Hepatic cirrhosis, unspecified hepatic cirrhosis type, unspecified whether ascites present ([facility] [address]      (Hepatic cirrhosis, unspecified hepatic cirrhosis type, unspecified whether ascites present ([facility] [address] [K74.60])    Surgeons: Dinh Turcios MD Responsible Provider: Madison Armstrong MD    Anesthesia Type: MAC ASA Status: 3          Anesthesia Type: No value filed.     José Phase I: José Score: 10    José Phase II: José Score: 10      Anesthesia Post Evaluation    Patient location during evaluation: PACU  Level of consciousness: awake  Complications: no  Multimodal analgesia pain management approach

## 2023-01-19 NOTE — PLAN OF CARE
Problem: Pain  Goal: Verbalizes/displays adequate comfort level or baseline comfort level  Outcome: Progressing  Flowsheets (Taken 1/19/2023 0745)  Verbalizes/displays adequate comfort level or baseline comfort level:   Encourage patient to monitor pain and request assistance   Assess pain using appropriate pain scale   Administer analgesics based on type and severity of pain and evaluate response  Note: Patient's pain level assessed and PRN pain medication made available when needed for pain.     Problem: ABCDS Injury Assessment  Goal: Absence of physical injury  Outcome: Progressing  Flowsheets (Taken 1/19/2023 0745)  Absence of Physical Injury: Implement safety measures based on patient assessment  Note: Safety measures in place and room clear of clutter.

## 2023-01-19 NOTE — PROGRESS NOTES
Patient A/O x4 and is independent in the room. Patient has been NPO most of day and has been off floor due to having a MRI scan, EGD, and a Colonoscopy. Patient returned without issue and new diet was placed for patient. BP elevated after returning. MD aware and ordered one time dose of Labetalol IV. Waiting for medication to be sent from pharmacy. Will give medication once available. No other issues.

## 2023-01-19 NOTE — PROGRESS NOTES
PM Assessment completed. BP (!) 148/98   Pulse 98   Temp 99.8 °F (37.7 °C) (Oral)   Resp 18   Ht 5' 11\" (1.803 m)   Wt 222 lb 10.6 oz (101 kg)   SpO2 95%   BMI 31.06 kg/m²     Alert and oriented x4. C/o ongoing \"usual\" pain right flank. Declined pain medications. Drank 1st bottle of Golytley and is actively having BM's. Consent for colonoscopy and EGD signed and placed on the chart. Questioning why he has not had his thyroid medication. Note left for MD.     Calls appropriately. Plan of care, education and safety measures reviewed and mutually agreed upon with the patient. Needed items including call light in reach and exit alarm in place.        Electronically signed by Roger Roth RN on 1/18/2023 at 9:34 PM

## 2023-01-19 NOTE — PROGRESS NOTES
2nd Golytely ordered for this time refused by patient. He states that he was told when it is running clear he can stop. Reports that BMs are Clear.

## 2023-01-19 NOTE — PROGRESS NOTES
[facility] [place]  GI progress note      Patient: Bipin Powers  : 1945       Date:  2023    Subjective: Interval history  No acute events overnight. Patient seen and examined at bedside this morning. He continues to have a dull right-sided pain in his abdomen. Continues to feel bloated and constipated. MRI of the abdomen pending. Past Medical History:   Diagnosis Date    Cancer ([facility] [address]     throat     COPD (chronic obstructive pulmonary disease) ([facility] [address]     Encounter for imaging to screen for metal prior to MRI 2023    MRI Conditional Pacemaker [employer] Edora 8 HF-T QP model#[id number] Leads: -177, -179, LV [id number]. Normal Mode. [employer] Rep and RN must be present. Pt must be A/OX4  per [employer] guidelines. Follow all other [employer] guidelines. Pt currrently follows  at       History reviewed. No pertinent surgical history. Past Endoscopic History no prior EGD or colonoscopy    Admission Meds  No current facility-administered medications on file prior to encounter.      Current Outpatient Medications on File Prior to Encounter   Medication Sig Dispense Refill    mupirocin (BACTROBAN NASAL) 2 % nasal ointment by Nasal route 2 times daily      sodium chloride (OCEAN, BABY AYR) 0.65 % nasal spray 1 spray by Nasal route as needed for Congestion      VALSARTAN PO Take by mouth      albuterol sulfate HFA (PROVENTIL;VENTOLIN;PROAIR) 108 (90 Base) MCG/ACT inhaler Inhale 2 puffs into the lungs every 6 hours as needed      apixaban (ELIQUIS) 5 MG TABS tablet Take 5 mg by mouth 2 times daily      aspirin 81 MG chewable tablet Take 81 mg by mouth daily      atorvastatin (LIPITOR) 20 MG tablet Take 20 mg by mouth daily      Multiple Vitamins-Minerals (THERAPEUTIC MULTIVITAMIN-MINERALS) tablet Take 1 tablet by mouth daily      Ascorbic Acid (VITAMIN C) 250 MG tablet Take 250 mg by mouth daily      vitamin B-6 (PYRIDOXINE) 100 MG tablet Take 100 mg by mouth daily levothyroxine (SYNTHROID) 125 MCG tablet  (Patient not taking: No sig reported)      valsartan (DIOVAN) 40 MG tablet       benzocaine (BABY ORAJEL) 7.5 % oral gel Apply 3-4 drops in affected ear every 3 hours as needed for ear pain. May sub 5-20% gel/liquid (Patient not taking: Reported on 2023) 1 Tube 0    tiotropium (SPIRIVA) 18 MCG inhalation capsule Inhale 18 mcg into the lungs daily      levothyroxine (SYNTHROID) 150 MCG tablet Take 150 mcg by mouth Daily (Patient not taking: No sig reported)      amLODIPine (NORVASC) 5 MG tablet Take 5 mg by mouth daily (Patient not taking: Reported on 2023)         Patient uses ASA, denies NSAID use. Allergies  Allergies   Allergen Reactions    Penicillins       Social   Social History     Tobacco Use    Smoking status: Former     Types: Cigarettes     Quit date: 6/3/2005     Years since quittin.6    Smokeless tobacco: Not on file   Substance Use Topics    Alcohol use: No        History reviewed. No pertinent family history. No family history of colon cancer, Crohn's disease, or ulcerative colitis. Review of Systems  Pertinent items are noted in HPI.        Physical Exam    BP (!) 150/69   Pulse 90   Temp 98.7 °F (37.1 °C) (Oral)   Resp 18   Ht 5' 11\" (1.803 m)   Wt 222 lb 10.6 oz (101 kg)   SpO2 94%   BMI 31.06 kg/m²   General appearance: alert, cooperative, no distress, appears stated age  Anicteric, No Jaundice  Head: Normocephalic, without obvious abnormality  Lungs: clear to auscultation bilaterally, Normal Effort  Heart: regular rate and rhythm, normal S1 and S2, no murmurs or rubs  Abdomen: soft, non-tender; bowel sounds normal; no masses,  no organomegaly  Extremities: atraumatic, no cyanosis or edema  Skin: warm and dry  Neuro: intact  AAOX3      Data Review:    Recent Labs     23  0612 23  1610 23  0658   WBC 4.3 4.3 4.8   HGB 14.2 14.0 14.3   HCT 42.5 42.4 45.1   MCV 87.3 86.9 90.7    178 188     Recent Labs 01/17/23  1220 01/18/23  0612 01/19/23  0658   * 134* 138   K 4.3 4.3 4.2    101 104   CO2 22 21 22   PHOS  --  2.7 3.1   BUN 9 8 11   CREATININE 0.8 0.6* 0.8     Recent Labs     01/17/23  1220 01/18/23  0612 01/19/23  0658   * 509* 454*   * 139* 119*   BILIDIR  --  1.2* 1.5*   BILITOT 2.3* 2.2* 2.6*   ALKPHOS 348* 337* 301*     Recent Labs     01/17/23  1220   LIPASE 50.0     Recent Labs     01/18/23  0612 01/18/23  1610   PROTIME 17.5* 18.0*   INR 1.44* 1.49*     No results for input(s): PTT in the last 72 hours. No results for input(s): OCCULTBLD in the last 72 hours. Imaging Studies:                            Ultrasound: June 2015  The liver is normal in echogenicity and homogeneous in echotexture. There are no apparent focal hepatic masses. There is no intrahepatic biliary ductal dilation. The common bile duct is normal in caliber, measuring approximately 3 mm. The gallbladder is collapsed but otherwise normal. No free fluid is seen in the abdomen. The pancreas is incompletely visualized; however, the visible portions appear normal.     The kidneys demonstrate normal size and echogenicity. There is no hydronephrosis. The right kidney measures 11.2 cm in size. The left kidney measures 12.8 cm in size. The spleen is normal in size, and measures 12.6 cm in longest dimension. The visualized portions of the aorta and IVC are normal in caliber and demonstrate internal flow. CT-scan of abdomen and pelvis: No prior                 Assessment:     Principal Problem:    Cirrhosis (HCC)  Active Problems:    Liver mass    Portal vein thrombosis    Hx of pulmonary embolus    Constipation  Resolved Problems:    * No resolved hospital problems.  *        Right hepatic lobe nodules    Recommendations:     -Recommend MRI of the liver with contrast-pending, will be going down for imaging at 1130  -Hepatitis panel, AFP, F-actin, ceruloplasmin pending   -Plan for EGD and colonoscopy today at 2 PM    Thank you for the opportunity to participate in [address] [place] [address].     Will discuss with attending physician Dr. Arely Kaiser, DO  PGY-3, Internal Medicine

## 2023-01-20 PROBLEM — B17.10 ACUTE HEPATITIS C VIRUS INFECTION WITHOUT HEPATIC COMA: Status: ACTIVE | Noted: 2023-01-20

## 2023-01-20 PROBLEM — C22.0 HEPATOCELLULAR CARCINOMA (HCC): Status: ACTIVE | Noted: 2023-01-20

## 2023-01-20 LAB
AFP: 649.1 UG/L
ALBUMIN SERPL-MCNC: 2.9 G/DL (ref 3.4–5)
ALP BLD-CCNC: 295 U/L (ref 40–129)
ALT SERPL-CCNC: 115 U/L (ref 10–40)
ANION GAP SERPL CALCULATED.3IONS-SCNC: 12 MMOL/L (ref 3–16)
AST SERPL-CCNC: 454 U/L (ref 15–37)
BASOPHILS ABSOLUTE: 0 K/UL (ref 0–0.2)
BASOPHILS RELATIVE PERCENT: 0.9 %
BILIRUB SERPL-MCNC: 2.7 MG/DL (ref 0–1)
BILIRUBIN DIRECT: 1.5 MG/DL (ref 0–0.3)
BILIRUBIN, INDIRECT: 1.2 MG/DL (ref 0–1)
BUN BLDV-MCNC: 13 MG/DL (ref 7–20)
CALCIUM SERPL-MCNC: 8.7 MG/DL (ref 8.3–10.6)
CHLORIDE BLD-SCNC: 101 MMOL/L (ref 99–110)
CO2: 20 MMOL/L (ref 21–32)
CREAT SERPL-MCNC: 0.7 MG/DL (ref 0.8–1.3)
EOSINOPHILS ABSOLUTE: 0.1 K/UL (ref 0–0.6)
EOSINOPHILS RELATIVE PERCENT: 2.8 %
F-ACTIN AB IGG: 18 UNITS (ref 0–19)
GFR SERPL CREATININE-BSD FRML MDRD: >60 ML/MIN/{1.73_M2}
GLUCOSE BLD-MCNC: 109 MG/DL (ref 70–99)
HCT VFR BLD CALC: 43.7 % (ref 40.5–52.5)
HEMOGLOBIN: 14.5 G/DL (ref 13.5–17.5)
LYMPHOCYTES ABSOLUTE: 0.7 K/UL (ref 1–5.1)
LYMPHOCYTES RELATIVE PERCENT: 15.6 %
MAGNESIUM: 1.8 MG/DL (ref 1.8–2.4)
MCH RBC QN AUTO: 28.9 PG (ref 26–34)
MCHC RBC AUTO-ENTMCNC: 33.3 G/DL (ref 31–36)
MCV RBC AUTO: 86.8 FL (ref 80–100)
MONOCYTES ABSOLUTE: 0.5 K/UL (ref 0–1.3)
MONOCYTES RELATIVE PERCENT: 11.1 %
NEUTROPHILS ABSOLUTE: 3.4 K/UL (ref 1.7–7.7)
NEUTROPHILS RELATIVE PERCENT: 69.6 %
PDW BLD-RTO: 18.6 % (ref 12.4–15.4)
PHOSPHORUS: 2.8 MG/DL (ref 2.5–4.9)
PLATELET # BLD: 217 K/UL (ref 135–450)
PMV BLD AUTO: 8.9 FL (ref 5–10.5)
POTASSIUM SERPL-SCNC: 4.1 MMOL/L (ref 3.5–5.1)
RBC # BLD: 5.03 M/UL (ref 4.2–5.9)
SODIUM BLD-SCNC: 133 MMOL/L (ref 136–145)
TOTAL PROTEIN: 7.4 G/DL (ref 6.4–8.2)
WBC # BLD: 4.8 K/UL (ref 4–11)

## 2023-01-20 PROCEDURE — 80076 HEPATIC FUNCTION PANEL: CPT

## 2023-01-20 PROCEDURE — 36415 COLL VENOUS BLD VENIPUNCTURE: CPT

## 2023-01-20 PROCEDURE — 6370000000 HC RX 637 (ALT 250 FOR IP): Performed by: INTERNAL MEDICINE

## 2023-01-20 PROCEDURE — 80069 RENAL FUNCTION PANEL: CPT

## 2023-01-20 PROCEDURE — 6360000002 HC RX W HCPCS: Performed by: INTERNAL MEDICINE

## 2023-01-20 PROCEDURE — 1200000000 HC SEMI PRIVATE

## 2023-01-20 PROCEDURE — 83735 ASSAY OF MAGNESIUM: CPT

## 2023-01-20 PROCEDURE — 82105 ALPHA-FETOPROTEIN SERUM: CPT

## 2023-01-20 PROCEDURE — 99232 SBSQ HOSP IP/OBS MODERATE 35: CPT | Performed by: SURGERY

## 2023-01-20 PROCEDURE — 2580000003 HC RX 258: Performed by: INTERNAL MEDICINE

## 2023-01-20 PROCEDURE — 85025 COMPLETE CBC W/AUTO DIFF WBC: CPT

## 2023-01-20 RX ORDER — ONDANSETRON 2 MG/ML
4 INJECTION INTRAMUSCULAR; INTRAVENOUS EVERY 6 HOURS PRN
Status: DISCONTINUED | OUTPATIENT
Start: 2023-01-20 | End: 2023-01-23 | Stop reason: HOSPADM

## 2023-01-20 RX ADMIN — APIXABAN 5 MG: 5 TABLET, FILM COATED ORAL at 21:29

## 2023-01-20 RX ADMIN — ALUMINUM HYDROXIDE, MAGNESIUM HYDROXIDE, AND SIMETHICONE: 200; 200; 20 SUSPENSION ORAL at 05:55

## 2023-01-20 RX ADMIN — LEVOTHYROXINE SODIUM 125 MCG: 0.12 TABLET ORAL at 05:55

## 2023-01-20 RX ADMIN — SODIUM CHLORIDE, PRESERVATIVE FREE 10 ML: 5 INJECTION INTRAVENOUS at 21:30

## 2023-01-20 RX ADMIN — ONDANSETRON 4 MG: 2 INJECTION INTRAMUSCULAR; INTRAVENOUS at 21:29

## 2023-01-20 RX ADMIN — ONDANSETRON 4 MG: 2 INJECTION INTRAMUSCULAR; INTRAVENOUS at 09:21

## 2023-01-20 RX ADMIN — OXYCODONE HYDROCHLORIDE 5 MG: 5 TABLET ORAL at 00:23

## 2023-01-20 RX ADMIN — SODIUM CHLORIDE, PRESERVATIVE FREE 10 ML: 5 INJECTION INTRAVENOUS at 09:15

## 2023-01-20 RX ADMIN — ACETAMINOPHEN 650 MG: 325 TABLET ORAL at 06:11

## 2023-01-20 RX ADMIN — HYDROMORPHONE HYDROCHLORIDE 0.25 MG: 1 INJECTION, SOLUTION INTRAMUSCULAR; INTRAVENOUS; SUBCUTANEOUS at 09:43

## 2023-01-20 RX ADMIN — LACTULOSE 20 G: 20 SOLUTION ORAL at 09:09

## 2023-01-20 RX ADMIN — ASPIRIN 81 MG: 81 TABLET, CHEWABLE ORAL at 09:10

## 2023-01-20 RX ADMIN — VALSARTAN 40 MG: 80 TABLET, FILM COATED ORAL at 09:10

## 2023-01-20 RX ADMIN — OXYCODONE HYDROCHLORIDE AND ACETAMINOPHEN 250 MG: 500 TABLET ORAL at 09:10

## 2023-01-20 RX ADMIN — HYDROMORPHONE HYDROCHLORIDE 0.25 MG: 1 INJECTION, SOLUTION INTRAMUSCULAR; INTRAVENOUS; SUBCUTANEOUS at 20:45

## 2023-01-20 RX ADMIN — APIXABAN 5 MG: 5 TABLET, FILM COATED ORAL at 09:10

## 2023-01-20 RX ADMIN — Medication 100 MG: at 09:10

## 2023-01-20 ASSESSMENT — ENCOUNTER SYMPTOMS
RESPIRATORY NEGATIVE: 1
ABDOMINAL PAIN: 1

## 2023-01-20 ASSESSMENT — PAIN DESCRIPTION - DESCRIPTORS
DESCRIPTORS: DISCOMFORT

## 2023-01-20 ASSESSMENT — PAIN DESCRIPTION - LOCATION
LOCATION: ABDOMEN
LOCATION: ABDOMEN;CHEST
LOCATION: ABDOMEN
LOCATION: ABDOMEN;CHEST;BACK
LOCATION: ABDOMEN;CHEST;BACK
LOCATION: ABDOMEN;CHEST
LOCATION: ABDOMEN

## 2023-01-20 ASSESSMENT — PAIN SCALES - GENERAL
PAINLEVEL_OUTOF10: 9
PAINLEVEL_OUTOF10: 4
PAINLEVEL_OUTOF10: 5
PAINLEVEL_OUTOF10: 9
PAINLEVEL_OUTOF10: 5
PAINLEVEL_OUTOF10: 10
PAINLEVEL_OUTOF10: 8
PAINLEVEL_OUTOF10: 6
PAINLEVEL_OUTOF10: 9

## 2023-01-20 ASSESSMENT — PAIN DESCRIPTION - ONSET
ONSET: ON-GOING

## 2023-01-20 ASSESSMENT — PAIN DESCRIPTION - ORIENTATION
ORIENTATION: RIGHT

## 2023-01-20 ASSESSMENT — PAIN - FUNCTIONAL ASSESSMENT
PAIN_FUNCTIONAL_ASSESSMENT: ACTIVITIES ARE NOT PREVENTED

## 2023-01-20 ASSESSMENT — PAIN DESCRIPTION - PAIN TYPE
TYPE: CHRONIC PAIN

## 2023-01-20 ASSESSMENT — PAIN DESCRIPTION - FREQUENCY
FREQUENCY: INTERMITTENT

## 2023-01-20 NOTE — PROGRESS NOTES
[address] [place] [address] [facility]  GI Progress Note          Susana Gaspar is a 69 y.o. male patient. 1. Transaminitis    2. Hyperbilirubinemia    3. Liver mass    4. Portal vein thrombosis    5. Hepatic cirrhosis, unspecified hepatic cirrhosis type, unspecified whether ascites present ([facility] [address]        Admit Date: 2023    Subjective:       Dysphagia a bit better after band ligation. ROS:  Cardiovascular ROS: no chest pain or dyspnea on exertion  Gastrointestinal ROS: positive for - swallowing difficulty/pain  Respiratory ROS: no cough, shortness of breath, or wheezing    Scheduled Meds:   levothyroxine  125 mcg Oral Daily    mupirocin   Nasal Daily    polyethylene glycol  2,000 mL Oral Once    sodium chloride flush  5-40 mL IntraVENous 2 times per day    lactulose  20 g Oral Daily    apixaban  5 mg Oral BID    vitamin C  250 mg Oral Daily    aspirin  81 mg Oral Daily    vitamin B-6  100 mg Oral Daily    valsartan  40 mg Oral Daily       Continuous Infusions:   sodium chloride         PRN Meds:  ondansetron, HYDROmorphone, sodium chloride, sodium chloride flush, sodium chloride, polyethylene glycol, acetaminophen **OR** acetaminophen, albuterol      Objective:       Patient Vitals for the past 24 hrs:   BP Temp Temp src Pulse Resp SpO2   23 1221 130/88 97.6 °F (36.4 °C) Oral 88 16 95 %   23 0943 -- -- -- -- 16 --   23 0759 (!) 142/94 98 °F (36.7 °C) Oral 94 18 95 %   23 0604 114/82 (!) 96.1 °F (35.6 °C) Oral (!) 102 18 92 %   23 0054 (!) 161/99 (!) 96.2 °F (35.7 °C) Oral (!) 102 19 96 %   23 2000 (!) 158/80 98.3 °F (36.8 °C) Oral 98 18 --   23 1701 (!) 184/111 98 °F (36.7 °C) Oral (!) 102 18 95 %       Exam:  VITALS:  /88   Pulse 88   Temp 97.6 °F (36.4 °C) (Oral)   Resp 16   Ht 5' 11\" (1.803 m)   Wt 222 lb 10.6 oz (101 kg)   SpO2 95%   BMI 31.06 kg/m²   TEMPERATURE:  Current - Temp: 97.6 °F (36.4 °C);  Max - Temp  Av.4 °F (36.3 °C)  Min: 96.1 °F (35.6 °C)  Max: 98.3 °F (36.8 °C)    NAD  General appearance: alert, appears stated age, cooperative and no distress  Head: Normocephalic, without obvious abnormality, atraumatic  Neck: supple, symmetrical, trachea midline and thyroid not enlarged, symmetric, no tenderness/mass/nodules  CVS:  RRR, Nl s1s2  Lungs CTA Bilaterally, normal effort  Abdomen: soft, non-tender; bowel sounds normal; no masses,  no organomegaly  AAOx3, No asterixis or encephalopathy  Extremities: No edema. Recent Labs     01/18/23  1610 01/19/23  0658 01/20/23  0643   WBC 4.3 4.8 4.8   HGB 14.0 14.3 14.5   HCT 42.4 45.1 43.7   MCV 86.9 90.7 86.8    188 217     Recent Labs     01/18/23  0612 01/19/23  0658 01/20/23  0643   * 138 133*   K 4.3 4.2 4.1    104 101   CO2 21 22 20*   PHOS 2.7 3.1 2.8   BUN 8 11 13   CREATININE 0.6* 0.8 0.7*     Recent Labs     01/18/23  0612 01/19/23  0658 01/20/23  0643   * 454* 454*   * 119* 115*   BILIDIR 1.2* 1.5* 1.5*   BILITOT 2.2* 2.6* 2.7*   ALKPHOS 337* 301* 295*     No results for input(s): LIPASE, AMYLASE in the last 72 hours. Recent Labs     01/18/23  0612 01/18/23  1610 01/19/23  0658 01/20/23  0643   PROT 7.3  --  7.0 7.4   INR 1.44* 1.49*  --   --      No results for input(s): PTT in the last 72 hours. No results for input(s): OCCULTBLD in the last 72 hours. Radiology review:    Cirrhotic liver morphology with extensive heterogeneity and a large area of contrast washout on the right hepatic lobe measuring 9.5 cm, and numerous additional nodular areas of washout throughout the remainder of the liver most concerning for    multifocal HCC. 2.  Expansile thrombus within the main, right, and left portal veins concerning for tumor thrombus. 3.  Nonspecific mildly enlarged periportal lymph nodes.        Assessment:       Principal Problem:    Cirrhosis ([address]  Active Problems:    Liver mass    Portal vein thrombosis    Hx of pulmonary embolus    Constipation    Possible Hepatocellular carcinoma ([facility] [address]    Acute hepatitis C virus infection without hepatic coma  Resolved Problems:    * No resolved hospital problems. 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 Had a long discussion with him. I discussed the curative options and palliative options for hepatocellular cancer and it looks like it is going to be a palliative treatment. He understands this. We discussed the location of treatment including at [address] [place] [address]. 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 Recommendations:         Once he is pain is controlled, he can be discharged. I will touch base with the use of [place] hepatobiliary surgery team to get him an appointment.     Yas Hamm MD  1/20/2023  3:50 PM

## 2023-01-20 NOTE — PROGRESS NOTES
Hospital Medicine Care  Progress Note      Chief complain; Bloated (Pt states he feels like he's bloated and has to belch. Symptoms x past month. States he feels like he cannot eat, nauseated. Small BMs. Has tried a bunch of stool softeners.)            Subjective:     C/o abd discomfort. No overnight issues. Review of Systems:     Review of Systems as mentioned above, other ros is negative. Objective:       Medications        Scheduled Meds:   levothyroxine  125 mcg Oral Daily    mupirocin   Nasal Daily    polyethylene glycol  2,000 mL Oral Once    sodium chloride flush  5-40 mL IntraVENous 2 times per day    lactulose  20 g Oral Daily    apixaban  5 mg Oral BID    vitamin C  250 mg Oral Daily    aspirin  81 mg Oral Daily    vitamin B-6  100 mg Oral Daily    valsartan  40 mg Oral Daily     Continuous Infusions:   sodium chloride       PRN Meds:.ondansetron, HYDROmorphone, sodium chloride, sodium chloride flush, sodium chloride, polyethylene glycol, acetaminophen **OR** acetaminophen, albuterol      Allergies  Allergies   Allergen Reactions    Penicillins          Vitals:    01/20/23 0604 01/20/23 0759 01/20/23 0943 01/20/23 1221   BP: 114/82 (!) 142/94  130/88   Pulse: (!) 102 94  88   Resp: 18 18 16 16   Temp: (!) 96.1 °F (35.6 °C) 98 °F (36.7 °C)  97.6 °F (36.4 °C)   TempSrc: Oral Oral  Oral   SpO2: 92% 95%  95%   Weight:       Height:             Physical exam:         Physical Exam  Constitutional:       Appearance: Normal appearance. HENT:      Head: Normocephalic and atraumatic. Cardiovascular:      Rate and Rhythm: Normal rate and regular rhythm. Pulses: Normal pulses. Heart sounds: Normal heart sounds. Pulmonary:      Effort: Pulmonary effort is normal. No respiratory distress. Breath sounds: Normal breath sounds. No wheezing. Abdominal:      General: Abdomen is flat. Bowel sounds are normal. There is no distension. Palpations: Abdomen is soft.       Tenderness: There is no abdominal tenderness. Musculoskeletal:         General: No swelling or deformity. Normal range of motion. Skin:     General: Skin is warm and dry. Capillary Refill: Capillary refill takes less than 2 seconds. Neurological:      General: No focal deficit present. Mental Status: He is alert and oriented to person, place, and time. Psychiatric:         Mood and Affect: Mood normal.         Behavior: Behavior normal.             Labs      Recent Labs     01/18/23  1610 01/19/23  0658 01/20/23  0643   WBC 4.3 4.8 4.8   HGB 14.0 14.3 14.5   HCT 42.4 45.1 43.7    188 217   MCV 86.9 90.7 86.8          Recent Labs     01/18/23  0612 01/19/23  0658 01/20/23  0643   * 138 133*   K 4.3 4.2 4.1    104 101   CO2 21 22 20*   PHOS 2.7 3.1 2.8   BUN 8 11 13   CREATININE 0.6* 0.8 0.7*         Recent Labs     01/18/23  0612 01/19/23  0658 01/20/23  0643   * 454* 454*   * 119* 115*   BILIDIR 1.2* 1.5* 1.5*   BILITOT 2.2* 2.6* 2.7*   ALKPHOS 337* 301* 295*         Recent Labs     01/20/23  0643   MG 1.80       Radiology  MRI ABDOMEN W WO CONTRAST   Final Result      1. Cirrhotic liver morphology with extensive heterogeneity and a large area of contrast washout on the right hepatic lobe measuring 9.5 cm, and numerous additional nodular areas of washout throughout the remainder of the liver most concerning for    multifocal HCC. 2.  Expansile thrombus within the main, right, and left portal veins concerning for tumor thrombus. 3.  Nonspecific mildly enlarged periportal lymph nodes. CT ABDOMEN PELVIS W IV CONTRAST Additional Contrast? None   Final Result      1. Cirrhotic liver. 2. Innumerable small hypoattenuating nodules mostly in the right hepatic lobe, nonspecific/indeterminate, may relate to degenerative nodules, dysplastic nodules or HCC. Recommended follow-up with contrast-enhanced MRI using liver protocol. 3. Suspected nonocclusive thrombus in portal vein. 4. Mildly enlarged periportal lymph nodes. 5. Trace ascites. 6. Mild wall thickening of cecum/ascending colon with adjacent fat stranding likely relate to portal colonopathy. Assessment and Plan:   Principal Problem:    Cirrhosis ([address]  Active Problems:    Liver mass    Portal vein thrombosis    Hx of pulmonary embolus    Constipation    Possible Hepatocellular carcinoma (HCC)    Acute hepatitis C virus infection without hepatic coma  Resolved Problems:    * No resolved hospital problems. *     Cirrhosis  New finding  Unclear etiology, denies alcohol use  Hepatitis panel: Hep C ab reactive. MRI showed concern for [address]  Onc eval     Portal vein thrombosis  Continue with Eliquis    History of PE  Continue with Eliquis    Constipation  Resolved. Hypothyroidism  TFT reviewed. Increased T4, will reduce home synthroid dose  Discussed with patient on OP monitoring.        Dispo: pending improvement  Anticipate 24 hours         Bienvenido Tate MD  1/20/2023

## 2023-01-20 NOTE — PROGRESS NOTES
Pt daughter Carmella January called concerning the pt medical condition, requested for a call back with the following number ([phone]), if there is any new development.

## 2023-01-20 NOTE — PROGRESS NOTES
Surgical Oncology  Daily Progress Note  Patient: Neil Willoughby      CC: Suspicion for Hepatocellular Carcinoma    SUBJECTIVE:   Patient complaining of dysphagia and epigastric abdominal pain. Also complains of nausea, but no vomiting. Last bowel movement was yesterday. ROS:   A 14 point review of systems was conducted, significant findings as noted above. All other systems negative.     OBJECTIVE:    PHYSICAL EXAM:    Vitals:    01/19/23 1701 01/19/23 2000 01/20/23 0054 01/20/23 0604   BP: (!) 184/111 (!) 158/80 (!) 161/99 114/82   Pulse: (!) 102 98 (!) 102 (!) 102   Resp: 18 18 19 18   Temp: 98 °F (36.7 °C) 98.3 °F (36.8 °C) (!) 96.2 °F (35.7 °C) (!) 96.1 °F (35.6 °C)   TempSrc: Oral Oral Oral Oral   SpO2: 95%  96% 92%   Weight:       Height:           General appearance: no acute distress, ill appearing  Eyes: Scleral icterus present, EOM grossly intact  Neck: trachea midline, no JVD  Chest/Lungs: normal effort with no accessory muscle use on RA  Cardiovascular: RRR  Abdomen: Distended, moderate tenderness, no rebound, guarding, or rigidity  Skin: warm and dry, no rashes  Extremities: no edema, no cyanosis  Neuro/psych: A&Ox3, no focal deficits, sensation intact    LABS:   Recent Labs     01/18/23  1610 01/19/23  0658   WBC 4.3 4.8   HGB 14.0 14.3   HCT 42.4 45.1   MCV 86.9 90.7    188        Recent Labs     01/18/23  0612 01/19/23  0658   * 138   K 4.3 4.2    104   CO2 21 22   PHOS 2.7 3.1   BUN 8 11   CREATININE 0.6* 0.8        Recent Labs     01/18/23  0612 01/19/23  0658   * 454*   * 119*   BILIDIR 1.2* 1.5*   BILITOT 2.2* 2.6*   ALKPHOS 337* 301*        Recent Labs     01/17/23  1220   LIPASE 50.0        Recent Labs     01/18/23  0612 01/18/23  1610 01/19/23  0658   PROT 7.3  --  7.0   INR 1.44* 1.49*  --         Recent Labs     01/17/23  1220   TROPONINI <0.01         ASSESSMENT & PLAN:   Neil Willoughby is a 69 y.o. 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 MRI today further demonstrates the innumerable masses concerning for hepatocellular carcinoma with likely tumor thromboses in the main, right, and left portal veins. Finally, pt underwent EGD today in which esophageal varices and severe portal hypertensive gastropathy were demonstrated. - AFP ordered, will follow up   - Will order CT chest to complete staging workup   - Dysphagia and epigastric pain likely due to EGD yesterday, which showed band ligated esophageal varices and portal hypertensive gastropathy   - Patient will need colonoscopy   - Remainder of care per primary     Yara Ham MD  PGY1, General Surgery  01/20/23  6:27 AM  [facility]#[phone]    I have personally performed the medical history, physical exam and medical decision making and agree with all pertinent clinical information unless otherwise noted. Answered multiple questions to clarify on diagnosis and treatment options.   He will likely get further treatment at [facility] as he is already established there with cancer center  Pain control  Establish care goals    Pamela Johnston MD  Surgery Attending

## 2023-01-20 NOTE — CONSULTS
Oncology Hematology Care    Consult Note      Requesting Physician:  Paddy Cid MD    CHIEF COMPLAINT:  Bloating       HISTORY OF PRESENT ILLNESS:    Mr. Joyner  is a 77 y.o. male we are seeing in consultation for     ICD-10-CM    1. Transaminitis  R74.01       2. Hyperbilirubinemia  E80.6       3. Liver mass  R16.0       4. Portal vein thrombosis  I81       5. Hepatic cirrhosis, unspecified hepatic cirrhosis type, unspecified whether ascites present (HCC)  K74.60 Surgical Pathology     Surgical Pathology     Surgical Pathology     Surgical Pathology     CANCELED: Surgical Pathology     CANCELED: Surgical Pathology         He presented to [facility] ED with c/o constipation and feeling bloated for the past couple of months. This has been associated with a 25 lb weight loss. He has a hx of recently diagnosed PE 10/22 and has been treated with Eliquis since that time. He did have ABD US 10/22 which showed hetergenous liver but no lesions. CT scan completed In ED with cirrhosis and innumerable small nodules concerning for HCC as well as a suspected nonocclusive thrombus in the portal vein. MRI findings concerning for hepatocellular carcinoma with likely tumor thromboses in the main, right, and left portal veins    He has a hx of tonsillar SCC s/p radical neck dissection and was treated with chemo and radiation. He was diagnosed in 1994 and treated at . He continues to follow up there and was just seen by his ENT approx 5 weeks ago.     Today, He c/o dysphagia and epigastric ABD pain. He has nausea but no vomiting. Had a BM yesterday. He is quite uncomfortable during my visit today. He reports frustration that he did not know that he had Hepatitis C and verbalizes concerns that this was never found previously during his care at .         Past Medical History:  Past Medical History:   Diagnosis Date     Cancer ([facility] [address]     throat 1995    COPD (chronic obstructive pulmonary disease) ([facility] [address]     Encounter for imaging to screen for metal prior to MRI 01/18/2023    MRI Conditional Pacemaker [employer] Edora 8 HF-T QP model#[id number] Leads: -177, -179, LV [id number]. Normal Mode. [employer] Rep and RN must be present. Pt must be A/OX4  per [employer] guidelines. Follow all other [employer] guidelines.  Pt currrently follows  at [facility]       Past Surgical History:  Past Surgical History:   Procedure Laterality Date    COLONOSCOPY N/A 1/19/2023    COLONOSCOPY POLYPECTOMY SNARE/COLD BIOPSY performed by Yas Hamm MD at [address] 1/19/2023    EGD BAND LIGATION performed by Yas Hamm MD at [facility] ENDOSCOPY       Current Medications:  Current Facility-Administered Medications   Medication Dose Route Frequency Provider Last Rate Last Admin    ondansetron (ZOFRAN) injection 4 mg  4 mg IntraVENous Q6H PRN Dorie Sebastian MD        HYDROmorphone (DILAUDID) injection 0.25 mg  0.25 mg IntraVENous Q4H PRN Dorie Sebastian MD        levothyroxine (SYNTHROID) tablet 125 mcg  125 mcg Oral Daily Dorie Sebastian MD   125 mcg at 01/20/23 0555    sodium chloride (OCEAN, BABY AYR) 0.65 % nasal spray 1 spray  1 spray Each Nostril PRN Arlene Umanzor MD        mupirocin (BACTROBAN) 2 % ointment   Nasal Daily Trey Darnell DO   Given at 01/18/23 1721    polyethylene glycol (GoLYTELY) solution 2,000 mL  2,000 mL Oral Once Yas Hamm MD        sodium chloride flush 0.9 % injection 5-40 mL  5-40 mL IntraVENous 2 times per day Arlene Umanzor MD   10 mL at 01/20/23 0915    sodium chloride flush 0.9 % injection 5-40 mL  5-40 mL IntraVENous PRN Arlene Umanzor MD        0.9 % sodium chloride infusion   IntraVENous PRN Arlene Umanzor MD   New Bag at 01/19/23 1450    polyethylene glycol (GLYCOLAX) packet 17 g  17 g Oral Daily PRN Arlene Umanzor MD        acetaminophen (TYLENOL) tablet 650 mg  650 mg Oral Q6H PRN Emilie Romero MD   650 mg at 23 1853    Or    acetaminophen (TYLENOL) suppository 650 mg  650 mg Rectal Q6H PRN Emilie Romero MD        lactulose (CHRONULAC) 10 GM/15ML solution 20 g  20 g Oral Daily Emilie Romero MD   20 g at 23 0909    albuterol (PROVENTIL) nebulizer solution 2.5 mg  2.5 mg Nebulization Q4H PRN Emilie Romero MD        apixaban Sherolyn Stare) tablet 5 mg  5 mg Oral BID Emilie Romero MD   5 mg at 23 3125    ascorbic acid (VITAMIN C) tablet 250 mg  250 mg Oral Daily Emilie Romero MD   250 mg at 23 5817    aspirin chewable tablet 81 mg  81 mg Oral Daily Emilie Romero MD   81 mg at 23 0910    vitamin B-6 (PYRIDOXINE) tablet 100 mg  100 mg Oral Daily Emilie Romero MD   100 mg at 23 0910    valsartan (DIOVAN) tablet 40 mg  40 mg Oral Daily Asa Hamm MD   40 mg at 23 0910       Allergies: Allergies   Allergen Reactions    Penicillins        Social History:  Social History     Socioeconomic History    Marital status: Single     Spouse name: Not on file    Number of children: Not on file    Years of education: Not on file    Highest education level: Not on file   Occupational History    Not on file   Tobacco Use    Smoking status: Former     Types: Cigarettes     Quit date: 6/3/2005     Years since quittin.6    Smokeless tobacco: Not on file   Substance and Sexual Activity    Alcohol use: No    Drug use: Not on file    Sexual activity: Not on file   Other Topics Concern    Not on file   Social History Narrative    Not on file     Social Determinants of Health     Financial Resource Strain: Not on file   Food Insecurity: Not on file   Transportation Needs: Not on file   Physical Activity: Not on file   Stress: Not on file   Social Connections: Not on file   Intimate Partner Violence: Not on file   Housing Stability: Not on file          Family History:  History reviewed. No pertinent family history.     REVIEW OF SYSTEMS:    Review of Systems   Constitutional:  Positive for appetite change. HENT:  Negative. Respiratory: Negative. Cardiovascular: Negative. Gastrointestinal:  Positive for abdominal pain. Endocrine: Negative. Genitourinary: Negative. Musculoskeletal: Negative. Skin: Negative. Neurological: Negative. Hematological: Negative. Psychiatric/Behavioral: Negative. PHYSICAL EXAM:      Vitals:  Patient Vitals for the past 24 hrs:   BP Temp Temp src Pulse Resp SpO2   01/20/23 0759 (!) 142/94 98 °F (36.7 °C) Oral 94 18 95 %   01/20/23 0604 114/82 (!) 96.1 °F (35.6 °C) Oral (!) 102 18 92 %   01/20/23 0054 (!) 161/99 (!) 96.2 °F (35.7 °C) Oral (!) 102 19 96 %   01/19/23 2000 (!) 158/80 98.3 °F (36.8 °C) Oral 98 18 --   01/19/23 1701 (!) 184/111 98 °F (36.7 °C) Oral (!) 102 18 95 %   01/19/23 1549 (!) 168/106 -- -- 100 16 100 %   01/19/23 1538 134/88 -- -- 94 16 96 %   01/19/23 1526 (!) 148/85 97.3 °F (36.3 °C) Temporal 96 16 100 %   01/19/23 1314 (!) 156/97 97.3 °F (36.3 °C) Temporal (!) 102 18 95 %       Date 01/20/23 0000 - 01/20/23 2359   Shift 0179-5900 8460-0191 3112-4315 24 Hour Total   INTAKE   P.O. 180   180   Shift Total(mL/kg) 180(1.8)   180(1.8)   OUTPUT   Shift Total(mL/kg)       Weight (kg) 101 101 101 101       Physical Exam  Vitals and nursing note reviewed. Constitutional:       Appearance: He is ill-appearing. HENT:      Head: Normocephalic and atraumatic. Nose: Nose normal.      Mouth/Throat:      Mouth: Mucous membranes are moist.   Eyes:      Pupils: Pupils are equal, round, and reactive to light. Cardiovascular:      Rate and Rhythm: Normal rate and regular rhythm. Pulmonary:      Effort: Pulmonary effort is normal.   Abdominal:      General: There is distension. Tenderness: There is abdominal tenderness. Comments: Acutely in pain  RN at bedside to give patient IV meds    Musculoskeletal:         General: Normal range of motion. Cervical back: Normal range of motion and neck supple. Skin:     General: Skin is warm and dry. Neurological:      General: No focal deficit present. Mental Status: He is alert and oriented to person, place, and time. Mental status is at baseline. Psychiatric:         Mood and Affect: Mood normal.         Behavior: Behavior normal.         Thought Content: Thought content normal.         Judgment: Judgment normal.          DATA:    PT/INR:    Recent Labs     01/20/23  0643 01/19/23  0658 01/18/23  1610 01/18/23  0612 01/17/23  1220   PROT 7.4 7.0  --  7.3 8.0   INR  --   --  1.49* 1.44*  --      PTT:  No results for input(s): APTT in the last 720 hours. CMP:    Recent Labs     01/20/23  0643   *   K 4.1      CO2 20*   BUN 13   PROT 7.4     Mg:    Recent Labs     01/20/23  0643   MG 1.80       Lab Results   Component Value Date    CALCIUM 8.7 01/20/2023    PHOS 2.8 01/20/2023       CBC:    Recent Labs     01/20/23  0643 01/19/23  0658 01/18/23  1610 01/18/23  0612 01/17/23  1220   WBC 4.8 4.8 4.3 4.3 4.2   NEUTROABS 3.4 3.0  --  2.8 2.9   LYMPHOPCT 15.6 19.1  --  17.5 16.3   RBC 5.03 4.97 4.88 4.87 5.22   HGB 14.5 14.3 14.0 14.2 14.8   HCT 43.7 45.1 42.4 42.5 44.9   MCV 86.8 90.7 86.9 87.3 85.9   MCH 28.9 28.7 28.6 29.1 28.3   MCHC 33.3 31.6 32.9 33.4 32.9   RDW 18.6* 19.4* 18.9* 18.4* 18.8*    188 178 185 223        LDH:No results for input(s): LDH in the last 720 hours. Radiology Review:  Colonoscopy  No dictation   EGD  No dictation   MRI ABDOMEN W WO CONTRAST  Narrative: EXAM: MRI ABDOMEN W WO CONTRAST    INDICATION: Liver masses    COMPARISON: CT abdomen and pelvis dated 1/17/2023. TECHNIQUE: Multiplanar multi-sequential MR images of the abdomen were obtained. Contrast: 10 ml Eovist.    FINDINGS:    LIVER: Cirrhotic liver morphology.  There is heterogeneous signal in the liver on T2-weighted images particularly throughout the right hepatic lobe with geographic and nodular areas of increased T2 signal. There is also some heterogeneous T1 signal on the   precontrast images. Following administration of intravenous contrast, there are scattered areas of mild nodular arterial enhancement throughout the right hepatic lobe. On later phases, there is significant washout involving a large portion of the right hepatic lobe with area measuring approximately 9.5 x 7.2 cm on series 23 image 31. Numerous additional surrounding nodular areas of washout in the right hepatic lobe   into a lesser extent in the left hepatic lobe. The signal changes correspond with bright signal on diffusion-weighted images. There is also expansile thrombus within the main portal vein and extending into the right and left portal branches. There is similar signal characteristics within the thrombus compared to hepatic tumor, and this is consistent with tumor thrombus. GALLBLADDER AND BILIARY TREE: No gallstones. No gallbladder distention. No intra- or extrahepatic biliary dilatation. PANCREAS: No pancreatic duct dilation. No suspicious pancreatic lesion. .    SPLEEN: Normal.    ADRENAL GLANDS: Normal.    KIDNEYS AND URETERS: No hydronephrosis. There are small scattered T2 hyperintense renal cysts. Kidneys otherwise demonstrate symmetric contrast enhancement. LYMPH NODES: There are mildly enlarged periportal lymph nodes which are nonspecific and could be reactive in the setting of chronic liver disease. PERITONEUM/RETROPERITONEUM: No significant free fluid, no focal fluid collection. VESSELS: Aorta is normal in caliber. There is portal vein thrombosis detailed above. ABDOMINAL WALL: Normal.    BONES: No suspicious marrow signal abnormality. OTHER FINDINGS: Mild thickening of the ascending colon is consistent with underlying portal colopathy. Impression: 1.   Cirrhotic liver morphology with extensive heterogeneity and a large area of contrast washout on the right hepatic lobe measuring 9.5 cm, and numerous additional nodular areas of washout throughout the remainder of the liver most concerning for   multifocal HCC. 2.  Expansile thrombus within the main, right, and left portal veins concerning for tumor thrombus. 3.  Nonspecific mildly enlarged periportal lymph nodes. Colonoscopy  No dictation   EGD  No dictation       Problem List  Patient Active Problem List   Diagnosis    History of liver failure    Liver mass    Cirrhosis (HCC)    Portal vein thrombosis    Hx of pulmonary embolus    Constipation    Possible Hepatocellular carcinoma (HCC)    Acute hepatitis C virus infection without hepatic coma       IMPRESSION/RECOMMENDATIONS:    Cirrhosis   Liver mass--Suspicion for Hepatocellular Carcinoma   Admitted 1/17/23 with newly diagnosed liver cirrhosis  with innumerable nodules vs masses on CT  MRI findings concerning for hepatocellular carcinoma with likely tumor thromboses in the main, right, and left portal veins  S/p EGD with variceal band ligation 1/19/22--severe portal hypertensive gastropathy was noted   AFP pending  CT Chest ordered to complete staging   Tissue sampling would be beneficial for molecular profiling and to assist with treatment decision making   He will likely prefer to be treated at  at he was previously treated there and remains active in his follow up there     History of PE  Diagnosed 10/22  Continue Eliquis     Hx SCC Tonsil  Diagnosed 1994  Treated with chemotherapy and radiation @       Thank you for asking me to see the patient.        Matthew Epstein, APRN - CNP  Please Contact Through [employer]

## 2023-01-20 NOTE — PLAN OF CARE
Problem: Discharge Planning  Goal: Discharge to home or other facility with appropriate resources  Outcome: Progressing     Problem: Pain  Goal: Verbalizes/displays adequate comfort level or baseline comfort level  Outcome: Progressing  Flowsheets  Taken 1/20/2023 1221  Verbalizes/displays adequate comfort level or baseline comfort level: Encourage patient to monitor pain and request assistance  Taken 1/20/2023 0759  Verbalizes/displays adequate comfort level or baseline comfort level: Encourage patient to monitor pain and request assistance     Problem: Nutrition Deficit:  Goal: Optimize nutritional status  Outcome: Progressing

## 2023-01-21 LAB
ALBUMIN SERPL-MCNC: 2.7 G/DL (ref 3.4–5)
ANION GAP SERPL CALCULATED.3IONS-SCNC: 10 MMOL/L (ref 3–16)
BASOPHILS ABSOLUTE: 0 K/UL (ref 0–0.2)
BASOPHILS RELATIVE PERCENT: 0.4 %
BUN BLDV-MCNC: 13 MG/DL (ref 7–20)
CALCIUM SERPL-MCNC: 8.2 MG/DL (ref 8.3–10.6)
CERULOPLASMIN: 41 MG/DL (ref 15–30)
CHLORIDE BLD-SCNC: 99 MMOL/L (ref 99–110)
CO2: 23 MMOL/L (ref 21–32)
CREAT SERPL-MCNC: 0.7 MG/DL (ref 0.8–1.3)
EOSINOPHILS ABSOLUTE: 0.1 K/UL (ref 0–0.6)
EOSINOPHILS RELATIVE PERCENT: 2.9 %
GFR SERPL CREATININE-BSD FRML MDRD: >60 ML/MIN/{1.73_M2}
GLUCOSE BLD-MCNC: 102 MG/DL (ref 70–99)
HCT VFR BLD CALC: 41 % (ref 40.5–52.5)
HEMOGLOBIN: 13.5 G/DL (ref 13.5–17.5)
LYMPHOCYTES ABSOLUTE: 0.9 K/UL (ref 1–5.1)
LYMPHOCYTES RELATIVE PERCENT: 18 %
MAGNESIUM: 1.9 MG/DL (ref 1.8–2.4)
MCH RBC QN AUTO: 29.1 PG (ref 26–34)
MCHC RBC AUTO-ENTMCNC: 33 G/DL (ref 31–36)
MCV RBC AUTO: 88.3 FL (ref 80–100)
MONOCYTES ABSOLUTE: 0.6 K/UL (ref 0–1.3)
MONOCYTES RELATIVE PERCENT: 12.5 %
NEUTROPHILS ABSOLUTE: 3.3 K/UL (ref 1.7–7.7)
NEUTROPHILS RELATIVE PERCENT: 66.2 %
PDW BLD-RTO: 18.6 % (ref 12.4–15.4)
PHOSPHORUS: 2.9 MG/DL (ref 2.5–4.9)
PLATELET # BLD: 211 K/UL (ref 135–450)
PMV BLD AUTO: 9 FL (ref 5–10.5)
POTASSIUM SERPL-SCNC: 4 MMOL/L (ref 3.5–5.1)
RBC # BLD: 4.64 M/UL (ref 4.2–5.9)
SODIUM BLD-SCNC: 132 MMOL/L (ref 136–145)
WBC # BLD: 5 K/UL (ref 4–11)

## 2023-01-21 PROCEDURE — 6360000002 HC RX W HCPCS: Performed by: INTERNAL MEDICINE

## 2023-01-21 PROCEDURE — 6370000000 HC RX 637 (ALT 250 FOR IP): Performed by: INTERNAL MEDICINE

## 2023-01-21 PROCEDURE — 85025 COMPLETE CBC W/AUTO DIFF WBC: CPT

## 2023-01-21 PROCEDURE — 36415 COLL VENOUS BLD VENIPUNCTURE: CPT

## 2023-01-21 PROCEDURE — 80069 RENAL FUNCTION PANEL: CPT

## 2023-01-21 PROCEDURE — 2580000003 HC RX 258: Performed by: INTERNAL MEDICINE

## 2023-01-21 PROCEDURE — 83735 ASSAY OF MAGNESIUM: CPT

## 2023-01-21 PROCEDURE — 1200000000 HC SEMI PRIVATE

## 2023-01-21 RX ORDER — PANTOPRAZOLE SODIUM 40 MG/1
40 TABLET, DELAYED RELEASE ORAL
Status: DISCONTINUED | OUTPATIENT
Start: 2023-01-21 | End: 2023-01-23 | Stop reason: HOSPADM

## 2023-01-21 RX ORDER — SENNA PLUS 8.6 MG/1
1 TABLET ORAL NIGHTLY
Status: DISCONTINUED | OUTPATIENT
Start: 2023-01-21 | End: 2023-01-23 | Stop reason: HOSPADM

## 2023-01-21 RX ORDER — OXYCODONE HYDROCHLORIDE 5 MG/1
5 TABLET ORAL EVERY 4 HOURS PRN
Status: DISCONTINUED | OUTPATIENT
Start: 2023-01-21 | End: 2023-01-23 | Stop reason: HOSPADM

## 2023-01-21 RX ORDER — OXYCODONE HYDROCHLORIDE 5 MG/1
10 TABLET ORAL EVERY 4 HOURS PRN
Status: DISCONTINUED | OUTPATIENT
Start: 2023-01-21 | End: 2023-01-22

## 2023-01-21 RX ORDER — PANTOPRAZOLE SODIUM 40 MG/1
40 TABLET, DELAYED RELEASE ORAL
Status: DISCONTINUED | OUTPATIENT
Start: 2023-01-22 | End: 2023-01-21

## 2023-01-21 RX ADMIN — MUPIROCIN: 20 OINTMENT TOPICAL at 09:49

## 2023-01-21 RX ADMIN — VALSARTAN 40 MG: 80 TABLET, FILM COATED ORAL at 09:45

## 2023-01-21 RX ADMIN — HYDROMORPHONE HYDROCHLORIDE 0.25 MG: 1 INJECTION, SOLUTION INTRAMUSCULAR; INTRAVENOUS; SUBCUTANEOUS at 17:03

## 2023-01-21 RX ADMIN — SENNOSIDES 8.6 MG: 8.6 TABLET, FILM COATED ORAL at 20:09

## 2023-01-21 RX ADMIN — ASPIRIN 81 MG: 81 TABLET, CHEWABLE ORAL at 09:46

## 2023-01-21 RX ADMIN — HYDROMORPHONE HYDROCHLORIDE 0.25 MG: 1 INJECTION, SOLUTION INTRAMUSCULAR; INTRAVENOUS; SUBCUTANEOUS at 06:37

## 2023-01-21 RX ADMIN — SODIUM CHLORIDE, PRESERVATIVE FREE 10 ML: 5 INJECTION INTRAVENOUS at 21:00

## 2023-01-21 RX ADMIN — APIXABAN 5 MG: 5 TABLET, FILM COATED ORAL at 09:15

## 2023-01-21 RX ADMIN — SODIUM CHLORIDE, PRESERVATIVE FREE 10 ML: 5 INJECTION INTRAVENOUS at 09:50

## 2023-01-21 RX ADMIN — APIXABAN 5 MG: 5 TABLET, FILM COATED ORAL at 20:09

## 2023-01-21 RX ADMIN — LEVOTHYROXINE SODIUM 125 MCG: 0.12 TABLET ORAL at 06:38

## 2023-01-21 RX ADMIN — OXYCODONE HYDROCHLORIDE 10 MG: 5 TABLET ORAL at 14:27

## 2023-01-21 RX ADMIN — OXYCODONE HYDROCHLORIDE 5 MG: 5 TABLET ORAL at 11:10

## 2023-01-21 RX ADMIN — OXYCODONE HYDROCHLORIDE 10 MG: 5 TABLET ORAL at 20:09

## 2023-01-21 RX ADMIN — ONDANSETRON 4 MG: 2 INJECTION INTRAMUSCULAR; INTRAVENOUS at 06:37

## 2023-01-21 RX ADMIN — PANTOPRAZOLE SODIUM 40 MG: 40 TABLET, DELAYED RELEASE ORAL at 11:10

## 2023-01-21 ASSESSMENT — PAIN - FUNCTIONAL ASSESSMENT
PAIN_FUNCTIONAL_ASSESSMENT: ACTIVITIES ARE NOT PREVENTED

## 2023-01-21 ASSESSMENT — PAIN DESCRIPTION - DESCRIPTORS
DESCRIPTORS: BURNING;SHARP
DESCRIPTORS: BURNING
DESCRIPTORS: DISCOMFORT
DESCRIPTORS: DISCOMFORT
DESCRIPTORS: BURNING
DESCRIPTORS: BURNING;SHARP
DESCRIPTORS: BURNING
DESCRIPTORS: BURNING;SHARP

## 2023-01-21 ASSESSMENT — PAIN DESCRIPTION - ORIENTATION
ORIENTATION: RIGHT
ORIENTATION: UPPER
ORIENTATION: RIGHT
ORIENTATION: RIGHT;MID
ORIENTATION: RIGHT
ORIENTATION: RIGHT;MID
ORIENTATION: RIGHT;MID

## 2023-01-21 ASSESSMENT — PAIN SCALES - GENERAL
PAINLEVEL_OUTOF10: 10
PAINLEVEL_OUTOF10: 8
PAINLEVEL_OUTOF10: 4
PAINLEVEL_OUTOF10: 9
PAINLEVEL_OUTOF10: 4
PAINLEVEL_OUTOF10: 10
PAINLEVEL_OUTOF10: 7
PAINLEVEL_OUTOF10: 10
PAINLEVEL_OUTOF10: 8

## 2023-01-21 ASSESSMENT — PAIN DESCRIPTION - ONSET: ONSET: ON-GOING

## 2023-01-21 ASSESSMENT — PAIN DESCRIPTION - LOCATION
LOCATION: ABDOMEN

## 2023-01-21 ASSESSMENT — PAIN DESCRIPTION - PAIN TYPE: TYPE: ACUTE PAIN

## 2023-01-21 ASSESSMENT — PAIN DESCRIPTION - FREQUENCY: FREQUENCY: INTERMITTENT

## 2023-01-21 ASSESSMENT — PAIN SCALES - WONG BAKER: WONGBAKER_NUMERICALRESPONSE: 8

## 2023-01-21 NOTE — PROGRESS NOTES
ONCOLOGY HEMATOLOGY CARE PROGRESS NOTE      SUBJECTIVE:    Complains of diffuse abdominal pain radiating to the back and to throat. Denies any associated nausea, vomiting. ROS:   12 point system reviewed. Positive for diffuse abdominal pain, rates that 12 out of 10 in intensity, radiating to back and throat. Positive for anorexia, significant weight loss and loss of interest in food. OBJECTIVE        Physical    VITALS:  Patient Vitals for the past 24 hrs:   BP Temp Temp src Pulse Resp SpO2 Weight   01/21/23 1600 (!) 155/94 97.9 °F (36.6 °C) Axillary 98 22 -- --   01/21/23 1224 (!) 143/81 97.6 °F (36.4 °C) Oral 87 18 -- --   01/21/23 0945 132/65 -- -- -- -- -- --   01/21/23 0805 134/84 97.6 °F (36.4 °C) Oral 90 19 95 % --   01/21/23 0748 -- -- -- -- -- -- 212 lb 9.6 oz (96.4 kg)   01/21/23 0710 130/87 97 °F (36.1 °C) Axillary 90 18 95 % --   01/21/23 0649 -- -- -- -- -- -- 207 lb 0.2 oz (93.9 kg)   01/21/23 0521 -- -- -- 95 -- -- --   01/21/23 0128 (!) 142/93 97.5 °F (36.4 °C) Oral 91 16 94 % --   01/20/23 2050 (!) 142/94 97.5 °F (36.4 °C) Oral 97 20 93 % --       24HR INTAKE/OUTPUT:    Intake/Output Summary (Last 24 hours) at 1/21/2023 1853  Last data filed at 1/21/2023 1810  Gross per 24 hour   Intake 450 ml   Output --   Net 450 ml       CONSTITUTIONAL: awake, alert, cooperative, appears to be restless in pain. EYES: pupils equal, round and reactive to light, sclera clear and conjunctiva normal  ENT: Normocephalic, without obvious abnormality, atraumatic  NECK: supple, symmetrical, no jugular venous distension and no carotid bruits     LUNGS: no increased work of breathing and clear to auscultation   CARDIOVASCULAR: regular rate and rhythm, normal S1 and S2, no murmur noted  ABDOMEN: Did not allow abdominal exam since he was very restless. MUSCULOSKELETAL: full range of motion noted, tone is normal  NEUROLOGIC: awake, alert, oriented to name, place and time. Motor skills grossly intact. SKIN: Normal skin color, texture, turgor and no jaundice. appears intact   EXTREMITIES: no LE edema     DATA:  CBC:    Recent Labs     01/21/23  0605 01/20/23  0643 01/19/23  0658 01/18/23  1610 01/18/23  0612   WBC 5.0 4.8 4.8 4.3 4.3   NEUTROABS 3.3 3.4 3.0  --  2.8   LYMPHOPCT 18.0 15.6 19.1  --  17.5   RBC 4.64 5.03 4.97 4.88 4.87   HGB 13.5 14.5 14.3 14.0 14.2   HCT 41.0 43.7 45.1 42.4 42.5   MCV 88.3 86.8 90.7 86.9 87.3   MCH 29.1 28.9 28.7 28.6 29.1   MCHC 33.0 33.3 31.6 32.9 33.4   RDW 18.6* 18.6* 19.4* 18.9* 18.4*    217 188 178 185       PT/INR:    Recent Labs     01/20/23  0643 01/19/23  0658 01/18/23  1610 01/18/23  0612 01/17/23  1220   PROT 7.4 7.0  --  7.3 8.0   INR  --   --  1.49* 1.44*  --      PTT:  No results for input(s): APTT in the last 720 hours. CMP:    Recent Labs     01/21/23  0605 01/20/23  0643 01/19/23  0658 01/18/23  0612 01/17/23  1220   * 133* 138 134* 135*   K 4.0 4.1 4.2 4.3 4.3   CL 99 101 104 101 102   CO2 23 20* 22 21 22   GLUCOSE 102* 109* 70 108* 105*   BUN 13 13 11 8 9   CREATININE 0.7* 0.7* 0.8 0.6* 0.8   LABGLOM >60 >60 >60 >60 >60   CALCIUM 8.2* 8.7 8.7 8.5 8.9   PROT  --  7.4 7.0 7.3 8.0   LABALBU 2.7* 2.9* 3.0* 3.1* 3.2*   AGRATIO  --   --   --   --  0.7*   BILITOT  --  2.7* 2.6* 2.2* 2.3*   ALKPHOS  --  295* 301* 337* 348*   ALT  --  115* 119* 139* 146*   AST  --  454* 454* 509* 532*   MG 1.90 1.80  --   --   --        Lab Results   Component Value Date    CALCIUM 8.2 (L) 01/21/2023    PHOS 2.9 01/21/2023       LDH:No results for input(s): LDH in the last 720 hours. Radiology Review:  Colonoscopy  No dictation   EGD  No dictation   MRI ABDOMEN W WO CONTRAST  Narrative: EXAM: MRI ABDOMEN W WO CONTRAST    INDICATION: Liver masses    COMPARISON: CT abdomen and pelvis dated 1/17/2023. TECHNIQUE: Multiplanar multi-sequential MR images of the abdomen were obtained.   Contrast: 10 ml Eovist.    FINDINGS:    LIVER: Cirrhotic liver morphology. There is heterogeneous signal in the liver on T2-weighted images particularly throughout the right hepatic lobe with geographic and nodular areas of increased T2 signal. There is also some heterogeneous T1 signal on the   precontrast images. Following administration of intravenous contrast, there are scattered areas of mild nodular arterial enhancement throughout the right hepatic lobe. On later phases, there is significant washout involving a large portion of the right hepatic lobe with area measuring approximately 9.5 x 7.2 cm on series 23 image 31. Numerous additional surrounding nodular areas of washout in the right hepatic lobe   into a lesser extent in the left hepatic lobe. The signal changes correspond with bright signal on diffusion-weighted images. There is also expansile thrombus within the main portal vein and extending into the right and left portal branches. There is similar signal characteristics within the thrombus compared to hepatic tumor, and this is consistent with tumor thrombus. GALLBLADDER AND BILIARY TREE: No gallstones. No gallbladder distention. No intra- or extrahepatic biliary dilatation. PANCREAS: No pancreatic duct dilation. No suspicious pancreatic lesion. .    SPLEEN: Normal.    ADRENAL GLANDS: Normal.    KIDNEYS AND URETERS: No hydronephrosis. There are small scattered T2 hyperintense renal cysts. Kidneys otherwise demonstrate symmetric contrast enhancement. LYMPH NODES: There are mildly enlarged periportal lymph nodes which are nonspecific and could be reactive in the setting of chronic liver disease. PERITONEUM/RETROPERITONEUM: No significant free fluid, no focal fluid collection. VESSELS: Aorta is normal in caliber. There is portal vein thrombosis detailed above. ABDOMINAL WALL: Normal.    BONES: No suspicious marrow signal abnormality.     OTHER FINDINGS: Mild thickening of the ascending colon is consistent with underlying portal colopathy. Impression: 1. Cirrhotic liver morphology with extensive heterogeneity and a large area of contrast washout on the right hepatic lobe measuring 9.5 cm, and numerous additional nodular areas of washout throughout the remainder of the liver most concerning for   multifocal HCC. 2.  Expansile thrombus within the main, right, and left portal veins concerning for tumor thrombus. 3.  Nonspecific mildly enlarged periportal lymph nodes. Colonoscopy  No dictation   EGD  No dictation       Problem List  Patient Active Problem List   Diagnosis    History of liver failure    Liver mass    Cirrhosis ([address]    Portal vein thrombosis    Hx of pulmonary embolus    Constipation    Possible Hepatocellular carcinoma ([address]    Acute hepatitis C virus infection without hepatic coma       ASSESSMENT AND PLAN:    1.Multifocal hepatocellular Carcinoma     -Reviewed the MRI of the abdomen done in this admission. The radiological findings with large area of contrast washout in the right hepatic lobe measuring 9.5 cm and multiple additional nodular areas of washout are highly suspicious of hepatocellular carcinoma. -CT scan of the chest needed to complete the staging work-up was not done at the [facility] since patient wishes to return to [facility] for further evaluation and treatment.  -He will likely be discharged tomorrow to pursue further care at [facility].    -Elevated serum AFP further points towards the  diagnosis. -Upper GI endoscopy done in this admission reveals multiple esophageal varices with severe portal hypertensive gastropathy.    -We will defer the management of [facility] to the oncology team at [facility].    2.  History of hepatitis C.  -Patient was a IV drug abuser approximately 20 years back.  -Tested positive for hepatitis C antibody but further evaluation has been deferred for now. 3.  Liver cirrhosis  -Secondary to chronic hepatitis C.  -Periportal lymph nodes enlarged.  -Prolonged PT and INR.   -INR remains less than 2 with no. Of overt bleeding.  -We will continue to monitor for the need of vitamin K.    4.  Portal vein thrombosis  -Tumor thrombosis versus secondary to portal hypertension due to liver cirrhosis. 5.  Transaminitis with mixed hyperbilirubinemia  -Management per primary team.    6.  History of squamous cell carcinoma of the tonsil, status post radical neck dissection with chemo radiotherapy, treated at [facility].  -Denies any history of local recurrence or distant metastasis. ONCOLOGIC DISPOSITION: Patient wishes to be evaluated and treated at [facility].   Discharge plan as per the primary team.      Dionne Leal MD  Please contact through [address]

## 2023-01-21 NOTE — PROGRESS NOTES
Hospital Medicine Care  Progress Note      Chief complain; Bloated (Pt states he feels like he's bloated and has to belch. Symptoms x past month. States he feels like he cannot eat, nauseated. Small BMs. Has tried a bunch of stool softeners.)            Subjective:     C/o abd discomfort ,pain   No overnight issues. Review of Systems:     Review of Systems as mentioned above, other ros is negative. Objective:       Medications        Scheduled Meds:   senna  1 tablet Oral Nightly    pantoprazole  40 mg Oral QAM AC    levothyroxine  125 mcg Oral Daily    mupirocin   Nasal Daily    polyethylene glycol  2,000 mL Oral Once    sodium chloride flush  5-40 mL IntraVENous 2 times per day    lactulose  20 g Oral Daily    apixaban  5 mg Oral BID    vitamin C  250 mg Oral Daily    aspirin  81 mg Oral Daily    vitamin B-6  100 mg Oral Daily    valsartan  40 mg Oral Daily     Continuous Infusions:   sodium chloride       PRN Meds:.oxyCODONE **OR** oxyCODONE, ondansetron, sodium chloride, sodium chloride flush, sodium chloride, polyethylene glycol, acetaminophen **OR** acetaminophen, albuterol      Allergies  Allergies   Allergen Reactions    Penicillins          Vitals:    01/21/23 0710 01/21/23 0748 01/21/23 0805 01/21/23 0945   BP: 130/87  134/84 132/65   Pulse: 90  90    Resp: 18  19    Temp: 97 °F (36.1 °C)  97.6 °F (36.4 °C)    TempSrc: Axillary  Oral    SpO2: 95%  95%    Weight:  212 lb 9.6 oz (96.4 kg)     Height:             Physical exam:         Physical Exam  Constitutional:       Appearance: Normal appearance. HENT:      Head: Normocephalic and atraumatic. Cardiovascular:      Rate and Rhythm: Normal rate and regular rhythm. Pulses: Normal pulses. Heart sounds: Normal heart sounds. Pulmonary:      Effort: Pulmonary effort is normal. No respiratory distress. Breath sounds: Normal breath sounds. No wheezing. Abdominal:      General: Abdomen is flat.  Bowel sounds are normal. There is no distension. Palpations: Abdomen is soft. Tenderness: There is no abdominal tenderness. Musculoskeletal:         General: No swelling or deformity. Normal range of motion. Skin:     General: Skin is warm and dry. Capillary Refill: Capillary refill takes less than 2 seconds. Neurological:      General: No focal deficit present. Mental Status: He is alert and oriented to person, place, and time. Psychiatric:         Mood and Affect: Mood normal.         Behavior: Behavior normal.             Labs      Recent Labs     01/19/23  0658 01/20/23  0643 01/21/23  0605   WBC 4.8 4.8 5.0   HGB 14.3 14.5 13.5   HCT 45.1 43.7 41.0    217 211   MCV 90.7 86.8 88.3          Recent Labs     01/19/23  0658 01/20/23  0643 01/21/23  0605    133* 132*   K 4.2 4.1 4.0    101 99   CO2 22 20* 23   PHOS 3.1 2.8 2.9   BUN 11 13 13   CREATININE 0.8 0.7* 0.7*         Recent Labs     01/19/23  0658 01/20/23  0643   * 454*   * 115*   BILIDIR 1.5* 1.5*   BILITOT 2.6* 2.7*   ALKPHOS 301* 295*         Recent Labs     01/20/23  0643 01/21/23  0605   MG 1.80 1.90         Radiology  MRI ABDOMEN W WO CONTRAST   Final Result      1. Cirrhotic liver morphology with extensive heterogeneity and a large area of contrast washout on the right hepatic lobe measuring 9.5 cm, and numerous additional nodular areas of washout throughout the remainder of the liver most concerning for    multifocal HCC. 2.  Expansile thrombus within the main, right, and left portal veins concerning for tumor thrombus. 3.  Nonspecific mildly enlarged periportal lymph nodes. CT ABDOMEN PELVIS W IV CONTRAST Additional Contrast? None   Final Result      1. Cirrhotic liver. 2. Innumerable small hypoattenuating nodules mostly in the right hepatic lobe, nonspecific/indeterminate, may relate to degenerative nodules, dysplastic nodules or HCC. Recommended follow-up with contrast-enhanced MRI using liver protocol. 3. Suspected nonocclusive thrombus in portal vein. 4. Mildly enlarged periportal lymph nodes. 5. Trace ascites. 6. Mild wall thickening of cecum/ascending colon with adjacent fat stranding likely relate to portal colonopathy. Assessment and Plan:   Principal Problem:    Cirrhosis ([address]  Active Problems:    Liver mass    Portal vein thrombosis    Hx of pulmonary embolus    Constipation    Possible Hepatocellular carcinoma (HCC)    Acute hepatitis C virus infection without hepatic coma  Resolved Problems:    * No resolved hospital problems. *     Cirrhosis  New finding  Unclear etiology, denies alcohol use  Hepatitis panel: Hep C ab reactive. MRI showed concern for [address]  AFP elevated   Onc consulted, suggested OP follow up at   Abd pain consistent with cancer, add po pain meds. Portal vein thrombosis  Continue with Eliquis    History of PE  Continue with Eliquis    Constipation  Resolved. Hypothyroidism  TFT reviewed. Increased T4, will reduce home synthroid dose  Discussed with patient on OP monitoring.        Dispo: 24 hours        Alejandra Pro MD  1/21/2023

## 2023-01-21 NOTE — PROGRESS NOTES
Still having abdominal pain with scale of 10 as per patient verbalization, given already 2 times Oxycodone but no relief at all, Informed to MD awaiting for further order

## 2023-01-21 NOTE — PROGRESS NOTES
Plan for discharge tomorrow once pain is control. Given the PRN Oxycodone 5mg at 1115am but still patient is in pain as he verbalized informed the MD if the next dose can be given early, Still awaiting for his reply.

## 2023-01-21 NOTE — PLAN OF CARE
Problem: Pain  Goal: Verbalizes/displays adequate comfort level or baseline comfort level  1/21/2023 1101 by [facility] Kenia Arrington RN  Outcome: Progressing  Flowsheets (Taken 1/21/2023 0820)  Verbalizes/displays adequate comfort level or baseline comfort level:   Encourage patient to monitor pain and request assistance   Assess pain using appropriate pain scale   Administer analgesics based on type and severity of pain and evaluate response   Implement non-pharmacological measures as appropriate and evaluate response   Consider cultural and social influences on pain and pain management   Notify Licensed Independent Practitioner if interventions unsuccessful or patient reports new pain    Problem: ABCDS Injury Assessment  Goal: Absence of physical injury  Outcome: Progressing  Absence of Physical Injury: Implement safety measures based on patient assessment   Absence of Physical Injury: Implement safety measures based on patient assessment      Problem: Nutrition Deficit:  Goal: Optimize nutritional status  Outcome: Progressing  Flowsheets (Taken 1/21/2023 0825)  Nutrient intake appropriate for improving, restoring, or maintaining nutritional needs:   Assess nutritional status and recommend course of action   Recommend appropriate diets, oral nutritional supplements, and vitamin/mineral supplements     Problem: Safety - Adult  Goal: Free from fall injury  1/21/2023 1101 by [facility] Erik Israel RN  Outcome: Progressing

## 2023-01-21 NOTE — PROGRESS NOTES
Patient is removing the telemetry, Iformed to MD and patient does not need it. Telemetry discontinued.

## 2023-01-21 NOTE — PROGRESS NOTES
[address] [place] [address] [place]  GI Progress Note          Jesus Rolon is a 69 y.o. male patient. 1. Transaminitis    2. Hyperbilirubinemia    3. Liver mass    4. Portal vein thrombosis    5. Hepatic cirrhosis, unspecified hepatic cirrhosis type, unspecified whether ascites present ([facility]        Admit Date: 1/17/2023    Subjective:       Dysphagia a bit better after band ligation. Still complains of pain all over the abdomen.       ROS:  Cardiovascular ROS: no chest pain or dyspnea on exertion  Gastrointestinal ROS: positive for - swallowing difficulty/pain  Respiratory ROS: no cough, shortness of breath, or wheezing    Scheduled Meds:   levothyroxine  125 mcg Oral Daily    mupirocin   Nasal Daily    polyethylene glycol  2,000 mL Oral Once    sodium chloride flush  5-40 mL IntraVENous 2 times per day    lactulose  20 g Oral Daily    apixaban  5 mg Oral BID    vitamin C  250 mg Oral Daily    aspirin  81 mg Oral Daily    vitamin B-6  100 mg Oral Daily    valsartan  40 mg Oral Daily       Continuous Infusions:   sodium chloride         PRN Meds:  ondansetron, HYDROmorphone, sodium chloride, sodium chloride flush, sodium chloride, polyethylene glycol, acetaminophen **OR** acetaminophen, albuterol      Objective:       Patient Vitals for the past 24 hrs:   BP Temp Temp src Pulse Resp SpO2 Weight   01/21/23 0805 134/84 97.6 °F (36.4 °C) Oral 90 19 95 % --   01/21/23 0748 -- -- -- -- -- -- 212 lb 9.6 oz (96.4 kg)   01/21/23 0710 130/87 97 °F (36.1 °C) Axillary 90 18 95 % --   01/21/23 0649 -- -- -- -- -- -- 207 lb 0.2 oz (93.9 kg)   01/21/23 0521 -- -- -- 95 -- -- --   01/21/23 0128 (!) 142/93 97.5 °F (36.4 °C) Oral 91 16 94 % --   01/20/23 2050 (!) 142/94 97.5 °F (36.4 °C) Oral 97 20 93 % --   01/20/23 1608 (!) 136/91 97.1 °F (36.2 °C) Oral 88 16 95 % --   01/20/23 1221 130/88 97.6 °F (36.4 °C) Oral 88 16 95 % --   01/20/23 0943 -- -- -- -- 16 -- --       Exam:  VITALS:  /84   Pulse 90   Temp 97.6 °F (36.4 °C) (Oral)   Resp 19   Ht 5' 11\" (1.803 m)   Wt 212 lb 9.6 oz (96.4 kg)   SpO2 95%   BMI 29.65 kg/m²   TEMPERATURE:  Current - Temp: 97.6 °F (36.4 °C); Max - Temp  Av.4 °F (36.3 °C)  Min: 97 °F (36.1 °C)  Max: 97.6 °F (36.4 °C)    NAD  General appearance: alert, appears stated age, cooperative and no distress  Head: Normocephalic, without obvious abnormality, atraumatic  Neck: supple, symmetrical, trachea midline and thyroid not enlarged, symmetric, no tenderness/mass/nodules  CVS:  RRR, Nl s1s2  Lungs CTA Bilaterally, normal effort  Abdomen: soft, non-tender; bowel sounds normal; no masses,  no organomegaly  AAOx3, No asterixis or encephalopathy  Extremities: No edema. Recent Labs     23  0658 23  0643 23  0605   WBC 4.8 4.8 5.0   HGB 14.3 14.5 13.5   HCT 45.1 43.7 41.0   MCV 90.7 86.8 88.3    217 211     Recent Labs     23  0658 23  0643 23  0605    133* 132*   K 4.2 4.1 4.0    101 99   CO2 22 20* 23   PHOS 3.1 2.8 2.9   BUN 11 13 13   CREATININE 0.8 0.7* 0.7*     Recent Labs     23  0658 23  0643   * 454*   * 115*   BILIDIR 1.5* 1.5*   BILITOT 2.6* 2.7*   ALKPHOS 301* 295*     No results for input(s): LIPASE, AMYLASE in the last 72 hours. Recent Labs     23  1610 23  0658 23  0643   PROT  --  7.0 7.4   INR 1.49*  --   --      No results for input(s): PTT in the last 72 hours. No results for input(s): OCCULTBLD in the last 72 hours. Radiology review:    Cirrhotic liver morphology with extensive heterogeneity and a large area of contrast washout on the right hepatic lobe measuring 9.5 cm, and numerous additional nodular areas of washout throughout the remainder of the liver most concerning for    multifocal HCC. 2.  Expansile thrombus within the main, right, and left portal veins concerning for tumor thrombus. 3.  Nonspecific mildly enlarged periportal lymph nodes.        Assessment:       Principal Problem:    Cirrhosis ([facility]  Active Problems:    Liver mass    Portal vein thrombosis    Hx of pulmonary embolus    Constipation    Possible Hepatocellular carcinoma (HCC)    Acute hepatitis C virus infection without hepatic coma  Resolved Problems:    * No resolved hospital problems. 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 Recommendations:         Once he is pain is controlled, he can be discharged. I discussed with UC team and they will contact him for appointment once they review his scans.     Rafi Sarmiento MD  1/21/2023  8:43 AM

## 2023-01-21 NOTE — PLAN OF CARE
Problem: Safety - Adult  Goal: Free from fall injury  Outcome: Progressing:  Remains free from falls. Problem: Pain  Goal: Verbalizes/displays adequate comfort level or baseline comfort level  Outcome: Progressing:  PRN Dilaudid 0.25 mg IV at 2045 helpful for c/o abdominal pain. Resting in bed, eyes closed. Medications per order. Bed in low position, call light in reach, will continue to monitor.

## 2023-01-22 PROCEDURE — 94761 N-INVAS EAR/PLS OXIMETRY MLT: CPT

## 2023-01-22 PROCEDURE — 6360000002 HC RX W HCPCS: Performed by: INTERNAL MEDICINE

## 2023-01-22 PROCEDURE — 1200000000 HC SEMI PRIVATE

## 2023-01-22 PROCEDURE — 6370000000 HC RX 637 (ALT 250 FOR IP): Performed by: INTERNAL MEDICINE

## 2023-01-22 PROCEDURE — 2700000000 HC OXYGEN THERAPY PER DAY

## 2023-01-22 PROCEDURE — 94640 AIRWAY INHALATION TREATMENT: CPT

## 2023-01-22 PROCEDURE — 2580000003 HC RX 258: Performed by: INTERNAL MEDICINE

## 2023-01-22 RX ORDER — OXYCODONE HCL 10 MG/1
10 TABLET, FILM COATED, EXTENDED RELEASE ORAL EVERY 12 HOURS SCHEDULED
Status: DISCONTINUED | OUTPATIENT
Start: 2023-01-22 | End: 2023-01-23 | Stop reason: HOSPADM

## 2023-01-22 RX ORDER — NALOXONE HYDROCHLORIDE 0.4 MG/ML
0.4 INJECTION, SOLUTION INTRAMUSCULAR; INTRAVENOUS; SUBCUTANEOUS PRN
Status: DISCONTINUED | OUTPATIENT
Start: 2023-01-22 | End: 2023-01-23 | Stop reason: HOSPADM

## 2023-01-22 RX ADMIN — OXYCODONE HYDROCHLORIDE 5 MG: 5 TABLET ORAL at 18:18

## 2023-01-22 RX ADMIN — ONDANSETRON 4 MG: 2 INJECTION INTRAMUSCULAR; INTRAVENOUS at 12:26

## 2023-01-22 RX ADMIN — ASPIRIN 81 MG: 81 TABLET, CHEWABLE ORAL at 08:39

## 2023-01-22 RX ADMIN — OXYCODONE HYDROCHLORIDE 10 MG: 10 TABLET, FILM COATED, EXTENDED RELEASE ORAL at 08:38

## 2023-01-22 RX ADMIN — SENNOSIDES 8.6 MG: 8.6 TABLET, FILM COATED ORAL at 21:24

## 2023-01-22 RX ADMIN — SODIUM CHLORIDE, PRESERVATIVE FREE 10 ML: 5 INJECTION INTRAVENOUS at 21:24

## 2023-01-22 RX ADMIN — MUPIROCIN: 20 OINTMENT TOPICAL at 08:45

## 2023-01-22 RX ADMIN — APIXABAN 5 MG: 5 TABLET, FILM COATED ORAL at 21:23

## 2023-01-22 RX ADMIN — OXYCODONE HYDROCHLORIDE 10 MG: 10 TABLET, FILM COATED, EXTENDED RELEASE ORAL at 21:23

## 2023-01-22 RX ADMIN — SODIUM CHLORIDE, PRESERVATIVE FREE 10 ML: 5 INJECTION INTRAVENOUS at 08:44

## 2023-01-22 RX ADMIN — APIXABAN 5 MG: 5 TABLET, FILM COATED ORAL at 08:39

## 2023-01-22 RX ADMIN — OXYCODONE HYDROCHLORIDE 10 MG: 5 TABLET ORAL at 04:39

## 2023-01-22 RX ADMIN — LEVOTHYROXINE SODIUM 125 MCG: 0.12 TABLET ORAL at 06:50

## 2023-01-22 RX ADMIN — PANTOPRAZOLE SODIUM 40 MG: 40 TABLET, DELAYED RELEASE ORAL at 06:50

## 2023-01-22 RX ADMIN — ALBUTEROL SULFATE 2.5 MG: 2.5 SOLUTION RESPIRATORY (INHALATION) at 01:26

## 2023-01-22 RX ADMIN — VALSARTAN 40 MG: 80 TABLET, FILM COATED ORAL at 08:38

## 2023-01-22 ASSESSMENT — PAIN DESCRIPTION - DESCRIPTORS
DESCRIPTORS: DISCOMFORT
DESCRIPTORS: DISCOMFORT;BURNING
DESCRIPTORS: DISCOMFORT;BURNING
DESCRIPTORS: BURNING;DISCOMFORT
DESCRIPTORS: ACHING;DISCOMFORT
DESCRIPTORS: BURNING
DESCRIPTORS: DISCOMFORT;BURNING

## 2023-01-22 ASSESSMENT — PAIN SCALES - GENERAL
PAINLEVEL_OUTOF10: 8
PAINLEVEL_OUTOF10: 9
PAINLEVEL_OUTOF10: 10
PAINLEVEL_OUTOF10: 9
PAINLEVEL_OUTOF10: 10
PAINLEVEL_OUTOF10: 10

## 2023-01-22 ASSESSMENT — PAIN DESCRIPTION - ONSET: ONSET: ON-GOING

## 2023-01-22 ASSESSMENT — PAIN - FUNCTIONAL ASSESSMENT
PAIN_FUNCTIONAL_ASSESSMENT: ACTIVITIES ARE NOT PREVENTED

## 2023-01-22 ASSESSMENT — PAIN DESCRIPTION - LOCATION
LOCATION: ABDOMEN

## 2023-01-22 ASSESSMENT — PAIN DESCRIPTION - PAIN TYPE
TYPE: ACUTE PAIN
TYPE: CHRONIC PAIN
TYPE: CHRONIC PAIN

## 2023-01-22 ASSESSMENT — PAIN DESCRIPTION - FREQUENCY: FREQUENCY: CONTINUOUS

## 2023-01-22 ASSESSMENT — PAIN DESCRIPTION - DIRECTION: RADIATING_TOWARDS: CHEST

## 2023-01-22 ASSESSMENT — PAIN DESCRIPTION - ORIENTATION
ORIENTATION: MID;UPPER
ORIENTATION: MID;RIGHT;UPPER
ORIENTATION: MID;UPPER
ORIENTATION: RIGHT;MID
ORIENTATION: MID;RIGHT
ORIENTATION: RIGHT;MID

## 2023-01-22 NOTE — PROGRESS NOTES
O2 sat  88% on room air. Patient denies SOB. Placed patient on 2 liters per NC . Sat's  increased to 95%. Secure message on call hospitalist. Call resp for breathing treatment per pt's request . Will continue to monitor.

## 2023-01-22 NOTE — PROGRESS NOTES
No further GI interventions during this hospital admission. UC follow up arrangements in place. Please call with questions.     Charlette Gottron, MD

## 2023-01-22 NOTE — PROGRESS NOTES
Hospital Medicine Care  Progress Note      Chief complain; Bloated (Pt states he feels like he's bloated and has to belch. Symptoms x past month. States he feels like he cannot eat, nauseated. Small BMs. Has tried a bunch of stool softeners.)            Subjective:     C/o abd discomfort ,pain   No overnight issues. Pain not adequately controlled        Review of Systems:     Review of Systems as mentioned above, other ros is negative. Objective:       Medications        Scheduled Meds:   oxyCODONE  10 mg Oral 2 times per day    senna  1 tablet Oral Nightly    pantoprazole  40 mg Oral QAM AC    levothyroxine  125 mcg Oral Daily    mupirocin   Nasal Daily    polyethylene glycol  2,000 mL Oral Once    sodium chloride flush  5-40 mL IntraVENous 2 times per day    lactulose  20 g Oral Daily    apixaban  5 mg Oral BID    vitamin C  250 mg Oral Daily    aspirin  81 mg Oral Daily    vitamin B-6  100 mg Oral Daily    valsartan  40 mg Oral Daily     Continuous Infusions:   sodium chloride       PRN Meds:.naloxone, oxyCODONE **OR** [DISCONTINUED] oxyCODONE, ondansetron, sodium chloride, sodium chloride flush, sodium chloride, polyethylene glycol, acetaminophen **OR** acetaminophen, albuterol      Allergies  Allergies   Allergen Reactions    Penicillins          Vitals:    01/22/23 0439 01/22/23 0645 01/22/23 0800 01/22/23 0838   BP:  107/72 (!) 150/94 (!) 152/91   Pulse:  91 89    Resp: 16 16 18    Temp:  97.9 °F (36.6 °C) 97.6 °F (36.4 °C)    TempSrc:  Oral Oral    SpO2:  92% 94%    Weight:       Height:             Physical exam:         Physical Exam  Constitutional:       Appearance: Normal appearance. HENT:      Head: Normocephalic and atraumatic. Cardiovascular:      Rate and Rhythm: Normal rate and regular rhythm. Pulses: Normal pulses. Heart sounds: Normal heart sounds. Pulmonary:      Effort: Pulmonary effort is normal. No respiratory distress. Breath sounds: Normal breath sounds.  No wheezing. Abdominal:      General: Abdomen is flat. Bowel sounds are normal. There is no distension. Palpations: Abdomen is soft. Tenderness: There is no abdominal tenderness. Musculoskeletal:         General: No swelling or deformity. Normal range of motion. Skin:     General: Skin is warm and dry. Capillary Refill: Capillary refill takes less than 2 seconds. Neurological:      General: No focal deficit present. Mental Status: He is alert and oriented to person, place, and time. Psychiatric:         Mood and Affect: Mood normal.         Behavior: Behavior normal.             Labs      Recent Labs     01/20/23  0643 01/21/23  0605   WBC 4.8 5.0   HGB 14.5 13.5   HCT 43.7 41.0    211   MCV 86.8 88.3          Recent Labs     01/20/23  0643 01/21/23  0605   * 132*   K 4.1 4.0    99   CO2 20* 23   PHOS 2.8 2.9   BUN 13 13   CREATININE 0.7* 0.7*         Recent Labs     01/20/23  0643   *   *   BILIDIR 1.5*   BILITOT 2.7*   ALKPHOS 295*         Recent Labs     01/20/23  0643 01/21/23  0605   MG 1.80 1.90         Radiology  MRI ABDOMEN W WO CONTRAST   Final Result      1. Cirrhotic liver morphology with extensive heterogeneity and a large area of contrast washout on the right hepatic lobe measuring 9.5 cm, and numerous additional nodular areas of washout throughout the remainder of the liver most concerning for    multifocal HCC. 2.  Expansile thrombus within the main, right, and left portal veins concerning for tumor thrombus. 3.  Nonspecific mildly enlarged periportal lymph nodes. CT ABDOMEN PELVIS W IV CONTRAST Additional Contrast? None   Final Result      1. Cirrhotic liver. 2. Innumerable small hypoattenuating nodules mostly in the right hepatic lobe, nonspecific/indeterminate, may relate to degenerative nodules, dysplastic nodules or HCC. Recommended follow-up with contrast-enhanced MRI using liver protocol.    3. Suspected nonocclusive thrombus in portal vein. 4. Mildly enlarged periportal lymph nodes. 5. Trace ascites. 6. Mild wall thickening of cecum/ascending colon with adjacent fat stranding likely relate to portal colonopathy. Assessment and Plan:   Principal Problem:    Cirrhosis ([address]  Active Problems:    Liver mass    Portal vein thrombosis    Hx of pulmonary embolus    Constipation    Possible Hepatocellular carcinoma (HCC)    Acute hepatitis C virus infection without hepatic coma  Resolved Problems:    * No resolved hospital problems. *     Cirrhosis  New finding  Unclear etiology, denies alcohol use  Hepatitis panel: Hep C ab reactive. MRI showed concern for [address].  AFP elevated   Onc consulted, suggested OP follow up at   Abd pain consistent with cancer, add po pain meds. Add po Oxycontin     Portal vein thrombosis  Continue with Eliquis    History of PE  Continue with Eliquis    Constipation  Resolved. Hypothyroidism  TFT reviewed. Increased T4, will reduce home synthroid dose  Discussed with patient on OP monitoring. Dispo: dc in am once pain is controlled with oral pain meds.            Vanessa Canela MD  1/22/2023

## 2023-01-22 NOTE — PLAN OF CARE
Problem: Discharge Planning  Goal: Discharge to home or other facility with appropriate resources  Outcome: Progressing     Problem: Pain  Goal: Verbalizes/displays adequate comfort level or baseline comfort level  1/21/2023 2308 by Moe Watt RN  Outcome: Progressing     Problem: ABCDS Injury Assessment  Goal: Absence of physical injury  1/21/2023 2308 by Moe Watt RN  Outcome: Progressing     Problem: Nutrition Deficit:  Goal: Optimize nutritional status  1/21/2023 2308 by Moe Watt RN  Outcome: Progressing

## 2023-01-22 NOTE — PLAN OF CARE
Problem: Pain  Goal: Verbalizes/displays adequate comfort level or baseline comfort level  1/22/2023 0940 by Natalia Favre June Lamsen-Sario, RN  Outcome: Progressing  Flowsheets (Taken 1/22/2023 0800)  Verbalizes/displays adequate comfort level or baseline comfort level:   Encourage patient to monitor pain and request assistance   Assess pain using appropriate pain scale   Administer analgesics based on type and severity of pain and evaluate response  Note: Explained the need to take the pain medication as prescribe to help reduce the pain    Problem: ABCDS Injury Assessment  Goal: Absence of physical injury  1/22/2023 0940 by Natalia Favre Donnel Median, RN  Outcome: Progressing  Flowsheets (Taken 1/19/2023 0745 by Lou Leyva RN)  Absence of Physical Injury: Implement safety measures based on patient assessment    Problem: Nutrition Deficit:  Goal: Optimize nutritional status  1/22/2023 0940 by Natalia Favre Donnel Median, RN  Outcome: Progressing  Flowsheets (Taken 1/22/2023 0800)  Nutrient intake appropriate for improving, restoring, or maintaining nutritional needs: Assess nutritional status and recommend course of action  Note: Encouraged to take small frequent feeding as tolerated    Problem: Safety - Adult  Goal: Free from fall injury  1/22/2023 0940 by Natalia Favre Donnel Median, RN  Outcome: Progressing    Problem: Discharge Planning  Goal: Discharge to home or other facility with appropriate resources  1/22/2023 0940 by Natalia Favre Donnel Median, RN  Outcome: Progressing

## 2023-01-23 VITALS
WEIGHT: 210.54 LBS | DIASTOLIC BLOOD PRESSURE: 80 MMHG | BODY MASS INDEX: 29.48 KG/M2 | HEART RATE: 93 BPM | RESPIRATION RATE: 17 BRPM | HEIGHT: 71 IN | OXYGEN SATURATION: 93 % | SYSTOLIC BLOOD PRESSURE: 126 MMHG | TEMPERATURE: 98.1 F

## 2023-01-23 LAB
ALBUMIN SERPL-MCNC: 2.8 G/DL (ref 3.4–5)
ANION GAP SERPL CALCULATED.3IONS-SCNC: 9 MMOL/L (ref 3–16)
BASOPHILS ABSOLUTE: 0 K/UL (ref 0–0.2)
BASOPHILS RELATIVE PERCENT: 0.2 %
BUN BLDV-MCNC: 17 MG/DL (ref 7–20)
CALCIUM SERPL-MCNC: 8.6 MG/DL (ref 8.3–10.6)
CHLORIDE BLD-SCNC: 102 MMOL/L (ref 99–110)
CO2: 23 MMOL/L (ref 21–32)
CREAT SERPL-MCNC: 0.9 MG/DL (ref 0.8–1.3)
EOSINOPHILS ABSOLUTE: 0.2 K/UL (ref 0–0.6)
EOSINOPHILS RELATIVE PERCENT: 3.3 %
GFR SERPL CREATININE-BSD FRML MDRD: >60 ML/MIN/{1.73_M2}
GLUCOSE BLD-MCNC: 83 MG/DL (ref 70–99)
HCT VFR BLD CALC: 41.3 % (ref 40.5–52.5)
HEMOGLOBIN: 13.8 G/DL (ref 13.5–17.5)
LYMPHOCYTES ABSOLUTE: 1 K/UL (ref 1–5.1)
LYMPHOCYTES RELATIVE PERCENT: 17.8 %
MAGNESIUM: 1.9 MG/DL (ref 1.8–2.4)
MCH RBC QN AUTO: 29.3 PG (ref 26–34)
MCHC RBC AUTO-ENTMCNC: 33.4 G/DL (ref 31–36)
MCV RBC AUTO: 87.7 FL (ref 80–100)
MONOCYTES ABSOLUTE: 0.5 K/UL (ref 0–1.3)
MONOCYTES RELATIVE PERCENT: 10.3 %
NEUTROPHILS ABSOLUTE: 3.6 K/UL (ref 1.7–7.7)
NEUTROPHILS RELATIVE PERCENT: 68.4 %
PDW BLD-RTO: 18.6 % (ref 12.4–15.4)
PHOSPHORUS: 2.7 MG/DL (ref 2.5–4.9)
PLATELET # BLD: 265 K/UL (ref 135–450)
PMV BLD AUTO: 8.1 FL (ref 5–10.5)
POTASSIUM SERPL-SCNC: 4.4 MMOL/L (ref 3.5–5.1)
RBC # BLD: 4.71 M/UL (ref 4.2–5.9)
SODIUM BLD-SCNC: 134 MMOL/L (ref 136–145)
WBC # BLD: 5.3 K/UL (ref 4–11)

## 2023-01-23 PROCEDURE — 85025 COMPLETE CBC W/AUTO DIFF WBC: CPT

## 2023-01-23 PROCEDURE — 6370000000 HC RX 637 (ALT 250 FOR IP): Performed by: INTERNAL MEDICINE

## 2023-01-23 PROCEDURE — 6360000002 HC RX W HCPCS

## 2023-01-23 PROCEDURE — 6370000000 HC RX 637 (ALT 250 FOR IP)

## 2023-01-23 PROCEDURE — 2580000003 HC RX 258: Performed by: INTERNAL MEDICINE

## 2023-01-23 PROCEDURE — 83735 ASSAY OF MAGNESIUM: CPT

## 2023-01-23 PROCEDURE — 80069 RENAL FUNCTION PANEL: CPT

## 2023-01-23 PROCEDURE — 36415 COLL VENOUS BLD VENIPUNCTURE: CPT

## 2023-01-23 RX ORDER — LACTULOSE 10 G/15ML
20 SOLUTION ORAL DAILY
Qty: 30 EACH | Refills: 1 | Status: SHIPPED | OUTPATIENT
Start: 2023-01-24

## 2023-01-23 RX ORDER — POLYETHYLENE GLYCOL 3350 17 G/17G
17 POWDER, FOR SOLUTION ORAL DAILY PRN
Qty: 527 G | Refills: 1 | Status: SHIPPED | OUTPATIENT
Start: 2023-01-23 | End: 2023-02-22

## 2023-01-23 RX ORDER — LEVOTHYROXINE SODIUM 0.12 MG/1
125 TABLET ORAL DAILY
Qty: 30 TABLET | Refills: 3 | Status: SHIPPED | OUTPATIENT
Start: 2023-01-24

## 2023-01-23 RX ORDER — OXYCODONE HCL 10 MG/1
10 TABLET, FILM COATED, EXTENDED RELEASE ORAL EVERY 12 HOURS SCHEDULED
Qty: 10 EACH | Refills: 0 | Status: SHIPPED | OUTPATIENT
Start: 2023-01-23 | End: 2023-01-28

## 2023-01-23 RX ORDER — OXYCODONE HCL 10 MG/1
10 TABLET, FILM COATED, EXTENDED RELEASE ORAL EVERY 12 HOURS SCHEDULED
Qty: 60 EACH | Refills: 0 | Status: SHIPPED | OUTPATIENT
Start: 2023-01-23 | End: 2023-01-23 | Stop reason: SDUPTHER

## 2023-01-23 RX ORDER — MAGNESIUM SULFATE 1 G/100ML
1000 INJECTION INTRAVENOUS ONCE
Status: COMPLETED | OUTPATIENT
Start: 2023-01-23 | End: 2023-01-23

## 2023-01-23 RX ORDER — PANTOPRAZOLE SODIUM 40 MG/1
40 TABLET, DELAYED RELEASE ORAL
Qty: 30 TABLET | Refills: 3 | Status: SHIPPED | OUTPATIENT
Start: 2023-01-24

## 2023-01-23 RX ADMIN — SODIUM CHLORIDE, PRESERVATIVE FREE 10 ML: 5 INJECTION INTRAVENOUS at 08:23

## 2023-01-23 RX ADMIN — MUPIROCIN: 20 OINTMENT TOPICAL at 11:19

## 2023-01-23 RX ADMIN — APIXABAN 5 MG: 5 TABLET, FILM COATED ORAL at 08:17

## 2023-01-23 RX ADMIN — LEVOTHYROXINE SODIUM 125 MCG: 0.12 TABLET ORAL at 05:19

## 2023-01-23 RX ADMIN — PANTOPRAZOLE SODIUM 40 MG: 40 TABLET, DELAYED RELEASE ORAL at 05:19

## 2023-01-23 RX ADMIN — OXYCODONE HYDROCHLORIDE 10 MG: 10 TABLET, FILM COATED, EXTENDED RELEASE ORAL at 08:17

## 2023-01-23 RX ADMIN — LACTULOSE 20 G: 20 SOLUTION ORAL at 08:16

## 2023-01-23 RX ADMIN — MAGNESIUM SULFATE IN DEXTROSE 1000 MG: 10 INJECTION, SOLUTION INTRAVENOUS at 09:30

## 2023-01-23 RX ADMIN — ASPIRIN 81 MG: 81 TABLET, CHEWABLE ORAL at 08:17

## 2023-01-23 RX ADMIN — DIBASIC SODIUM PHOSPHATE, MONOBASIC POTASSIUM PHOSPHATE AND MONOBASIC SODIUM PHOSPHATE 2 TABLET: 852; 155; 130 TABLET ORAL at 08:17

## 2023-01-23 RX ADMIN — VALSARTAN 40 MG: 80 TABLET, FILM COATED ORAL at 08:17

## 2023-01-23 ASSESSMENT — PAIN DESCRIPTION - ONSET
ONSET: ON-GOING
ONSET: ON-GOING

## 2023-01-23 ASSESSMENT — PAIN DESCRIPTION - FREQUENCY
FREQUENCY: CONTINUOUS
FREQUENCY: CONTINUOUS

## 2023-01-23 ASSESSMENT — PAIN SCALES - GENERAL
PAINLEVEL_OUTOF10: 6
PAINLEVEL_OUTOF10: 6
PAINLEVEL_OUTOF10: 9

## 2023-01-23 ASSESSMENT — PAIN - FUNCTIONAL ASSESSMENT
PAIN_FUNCTIONAL_ASSESSMENT: ACTIVITIES ARE NOT PREVENTED
PAIN_FUNCTIONAL_ASSESSMENT: ACTIVITIES ARE NOT PREVENTED

## 2023-01-23 ASSESSMENT — PAIN DESCRIPTION - ORIENTATION
ORIENTATION: MID
ORIENTATION: MID

## 2023-01-23 ASSESSMENT — PAIN DESCRIPTION - DESCRIPTORS
DESCRIPTORS: CRAMPING
DESCRIPTORS: ACHING;DISCOMFORT

## 2023-01-23 ASSESSMENT — PAIN DESCRIPTION - LOCATION
LOCATION: ABDOMEN
LOCATION: ABDOMEN

## 2023-01-23 ASSESSMENT — PAIN DESCRIPTION - PAIN TYPE
TYPE: ACUTE PAIN
TYPE: ACUTE PAIN

## 2023-01-23 NOTE — DISCHARGE SUMMARY
Discharge order received. Patient being DC home via lyft. All paperwork explained to patient. SHe verbalizes understanding and denies any questions. All belongings sent with patient. Meds to bed were sent with patient as well. IV removed.

## 2023-01-23 NOTE — DISCHARGE INSTR - DIET
Good nutrition is important when healing from an illness, injury, or surgery. Follow any nutrition recommendations given to you during your hospital stay. If you were given an oral nutrition supplement while in the hospital, continue to take this supplement at home. You can take it with meals, in-between meals, and/or before bedtime. These supplements can be purchased at most local grocery stores, pharmacies, and chain [employer]-stores. If you have any questions about your diet or nutrition, call the hospital and ask for the dietitian.   Resume home diet

## 2023-01-23 NOTE — CARE COORDINATION
Case Management Assessment            Discharge Note                    Date / Time of Note: 1/23/2023 1:16 PM                  Discharge Note Completed by: Gigi Avila RN    Patient Name: Nell Ibrahim   YOB: 1945  Diagnosis: Portal vein thrombosis [I81]  Hyperbilirubinemia [E80.6]  Transaminitis [R74.01]  Liver mass [R16.0]  History of liver failure [Z87.19]   Date / Time: 1/17/2023 11:22 AM    Current PCP: Frederick Darby patient: No    Hospitalization in the last 30 days: No    Advance Directives:  Code Status: Full Code  [place] DNR form completed and on chart: No    Financial:  Payor: Jun Moscoso / Plan: Len Guevara / Product Type: *No Product type* /      Pharmacy:    [facility] [id number] [address]  Phone: [phone] Fax: [phone]      Assistance purchasing medications?: Potential Assistance Purchasing Medications: No  Assistance provided by Case Management: None at this time    Does patient want to participate in local refill/ meds to beds program?: Yes    Meds To Beds General Rules:  1. Can ONLY be done Monday- Friday between 8:30am-5pm  2. Prescription(s) must be in pharmacy by 3pm to be filled same day  3. Copy of patient's insurance/ prescription drug card and patient face sheet must be sent along with the prescription(s)  4. Cost of Rx cannot be added to hospital bill. If financial assistance is needed, please contact unit  or ;  or  CANNOT provide pharmacy voucher for patients co-pays  5.  Patients can then  the prescription on their way out of the hospital at discharge, or pharmacy can deliver to the bedside if staff is available. (payment due at time of pick-up or delivery - cash, check, or card accepted)     Able to afford home medications/ co-pay costs: Yes    ADLS:  Current PT AM-PAC Score:   /24  Current OT AM-PAC Score:   /24      DISCHARGE Disposition: Home- No Services Needed    LOC at discharge: Not Applicable  COLLEEN Completed: Not Indicated    Notification completed in HENS/PAS?:  Not Applicable    IMM Completed:   Yes, Case management has presented and reviewed IMM letter #2 to the patient and/or family/ POA. Patient and/or family/POA verbalized understanding of their medicare rights and appeal process if needed. Patient and/or family/POA has signed, initialed and placed today's date (01/23/2023) and time ([phone]) on IMM letter #2 on the the appropriate lines. Patient and/or family/POA, copy of letter offered and they are aware that this original copy of IMM letter #2 is available prior to discharge from the paper chart on the unit. Electronic documentation has been entered into epic for IMM letter #2 and original paper copy has been added to the paper chart at the nurses station.      Transportation:  Transportation PLAN for discharge: family   Mode of Transport: Private Car  Reason for medical transport: Not Applicable  Name of [address] [place] [address],[address]: Not Applicable  Time of Transport: when family available     Transport form completed: No    Home Care:  [address] ordered at discharge: Not [address]: Not Applicable  Orders faxed: No    Durable Medical Equipment:  DME Provider: NA  Equipment obtained during hospitalization: NA    Home Oxygen and Respiratory Equipment:  Oxygen needed at discharge?: Not [address]: Not Applicable  Portable tank available for discharge?: Not Indicated    Dialysis:  Dialysis patient: No    Dialysis Center:  Not Applicable    Hospice Services:  Location: Not Applicable  Agency: Not Applicable    Consents signed: No    Referrals made at [facility] for outpatient continued care:  Not Applicable    Additional CM Notes:     CM  confirmed  d/c home today     Patient to follow up out  pt  as  instructed:    New Rx:  Meds to [employer] Utilized         these medications from [facility] [address] [facility] [phone] - F [phone]  lactulose  levothyroxine  pantoprazole  polyethylene glycol     these medications from any pharmacy with your printed prescription  oxyCODONE      Patient  has Limited  Rx  Coverage  and  Zev Forbes  was  provided to assist  with medication  compliance  $ 59.96  Dispo: home today if  pain is controlled with oral pain meds      The Plan for Transition of Care is related to the following treatment goals of Portal vein thrombosis [I81]  Hyperbilirubinemia [E80.6]  Transaminitis [R74.01]  Liver mass [R16.0]  History of liver failure [Z87.19]    The Patient and/or patient representative Lorie Dunham and his family were provided with a choice of provider and agrees with the discharge plan Yes    Freedom of choice list was provided with basic dialogue that supports the patient's individualized plan of care/goals and shares the quality data associated with the providers.  Yes    Care Transitions patient: No    Lorenzo Hutson RN  [facility]  Case Management Department  Ph: [phone]

## 2023-01-23 NOTE — PROGRESS NOTES
Hospital Medicine Care  Progress Note      Chief complain; Bloated (Pt states he feels like he's bloated and has to belch. Symptoms x past month. States he feels like he cannot eat, nauseated. Small BMs. Has tried a bunch of stool softeners.)            Subjective:     C/o abd discomfort ,pain   No overnight issues. Review of Systems:     Review of Systems as mentioned above, other ros is negative. Objective:       Medications        Scheduled Meds:   oxyCODONE  10 mg Oral 2 times per day    senna  1 tablet Oral Nightly    pantoprazole  40 mg Oral QAM AC    levothyroxine  125 mcg Oral Daily    mupirocin   Nasal Daily    polyethylene glycol  2,000 mL Oral Once    sodium chloride flush  5-40 mL IntraVENous 2 times per day    lactulose  20 g Oral Daily    apixaban  5 mg Oral BID    vitamin C  250 mg Oral Daily    aspirin  81 mg Oral Daily    vitamin B-6  100 mg Oral Daily    valsartan  40 mg Oral Daily     Continuous Infusions:   sodium chloride       PRN Meds:.naloxone, oxyCODONE **OR** [DISCONTINUED] oxyCODONE, ondansetron, sodium chloride, sodium chloride flush, sodium chloride, polyethylene glycol, acetaminophen **OR** acetaminophen, albuterol      Allergies  Allergies   Allergen Reactions    Penicillins          Vitals:    01/23/23 0515 01/23/23 0533 01/23/23 0814 01/23/23 1214   BP: (!) 128/91  (!) 134/92 126/80   Pulse: (!) 102  99 93   Resp: 16  17    Temp: 98.3 °F (36.8 °C)  97.6 °F (36.4 °C) 98.1 °F (36.7 °C)   TempSrc: Oral  Oral Oral   SpO2: 94%  95% 93%   Weight:  210 lb 8.6 oz (95.5 kg)     Height:             Physical exam:         Physical Exam  Constitutional:       Appearance: Normal appearance. HENT:      Head: Normocephalic and atraumatic. Cardiovascular:      Rate and Rhythm: Normal rate and regular rhythm. Pulses: Normal pulses. Heart sounds: Normal heart sounds. Pulmonary:      Effort: Pulmonary effort is normal. No respiratory distress.       Breath sounds: Normal breath sounds. No wheezing. Abdominal:      General: Abdomen is flat. Bowel sounds are normal. There is no distension. Palpations: Abdomen is soft. Tenderness: There is no abdominal tenderness. Musculoskeletal:         General: No swelling or deformity. Normal range of motion. Skin:     General: Skin is warm and dry. Capillary Refill: Capillary refill takes less than 2 seconds. Neurological:      General: No focal deficit present. Mental Status: He is alert and oriented to person, place, and time. Psychiatric:         Mood and Affect: Mood normal.         Behavior: Behavior normal.             Labs      Recent Labs     01/21/23  0605 01/23/23  0451   WBC 5.0 5.3   HGB 13.5 13.8   HCT 41.0 41.3    265   MCV 88.3 87.7          Recent Labs     01/21/23 0605 01/23/23 0451   * 134*   K 4.0 4.4   CL 99 102   CO2 23 23   PHOS 2.9 2.7   BUN 13 17   CREATININE 0.7* 0.9         No results for input(s): AST, ALT, ALB, BILIDIR, BILITOT, ALKPHOS in the last 72 hours. Recent Labs     01/21/23 0605 01/23/23 0451   MG 1.90 1.90         Radiology  MRI ABDOMEN W WO CONTRAST   Final Result      1. Cirrhotic liver morphology with extensive heterogeneity and a large area of contrast washout on the right hepatic lobe measuring 9.5 cm, and numerous additional nodular areas of washout throughout the remainder of the liver most concerning for    multifocal HCC. 2.  Expansile thrombus within the main, right, and left portal veins concerning for tumor thrombus. 3.  Nonspecific mildly enlarged periportal lymph nodes. CT ABDOMEN PELVIS W IV CONTRAST Additional Contrast? None   Final Result      1. Cirrhotic liver. 2. Innumerable small hypoattenuating nodules mostly in the right hepatic lobe, nonspecific/indeterminate, may relate to degenerative nodules, dysplastic nodules or HCC. Recommended follow-up with contrast-enhanced MRI using liver protocol.    3. Suspected nonocclusive thrombus in portal vein. 4. Mildly enlarged periportal lymph nodes. 5. Trace ascites. 6. Mild wall thickening of cecum/ascending colon with adjacent fat stranding likely relate to portal colonopathy. Assessment and Plan:   Principal Problem:    Cirrhosis ([address]  Active Problems:    Liver mass    Portal vein thrombosis    Hx of pulmonary embolus    Constipation    Possible Hepatocellular carcinoma (HCC)    Acute hepatitis C virus infection without hepatic coma  Resolved Problems:    * No resolved hospital problems. *     Cirrhosis  New finding  Unclear etiology, denies alcohol use  Hepatitis panel: Hep C ab reactive. MRI showed concern for [address]  AFP elevated   Onc consulted, suggested OP follow up at   Abd pain consistent with cancer, add po pain meds. Portal vein thrombosis  Continue with Eliquis    History of PE  Continue with Eliquis    Constipation  Resolved. Hypothyroidism  TFT reviewed. Increased T4, will reduce home synthroid dose  Discussed with patient on OP monitoring.        Dispo: d/c home with OP follow up        Jessica Mares MD  1/23/2023

## 2023-01-23 NOTE — PLAN OF CARE
Problem: Discharge Planning  Goal: Discharge to home or other facility with appropriate resources  Outcome: Progressing  Note: Discharge to home once stable      Problem: Pain  Goal: Verbalizes/displays adequate comfort level or baseline comfort level  Outcome: Progressing  Note: Scheduled Oxycodone given for pain. VSS. Problem: ABCDS Injury Assessment  Goal: Absence of physical injury  Outcome: Progressing     Problem: Nutrition Deficit:  Goal: Optimize nutritional status  Outcome: Progressing     Problem: Safety - Adult  Goal: Free from fall injury  Outcome: Progressing  Note: Patient up ad tremayne with steady gait. Non skid slippers on . Bed in low position with brakes on .

## 2023-01-24 LAB — AFP: 676 UG/L

## 2023-02-10 ENCOUNTER — HOSPITAL ENCOUNTER (EMERGENCY)
Age: 78
Discharge: HOME | End: 2023-02-10

## 2023-05-07 ENCOUNTER — HOSPITAL ENCOUNTER (EMERGENCY)
Age: 78
Discharge: HOME | End: 2023-05-07
Attending: EMERGENCY MEDICINE
Payer: MEDICARE

## 2023-05-07 VITALS
RESPIRATION RATE: 18 BRPM | DIASTOLIC BLOOD PRESSURE: 93 MMHG | HEIGHT: 71 IN | HEART RATE: 92 BPM | TEMPERATURE: 97.6 F | SYSTOLIC BLOOD PRESSURE: 132 MMHG | OXYGEN SATURATION: 99 % | WEIGHT: 198.8 LBS | BODY MASS INDEX: 27.83 KG/M2

## 2023-05-07 DIAGNOSIS — R04.0 EPISTAXIS: Primary | ICD-10-CM

## 2023-05-07 LAB
BASOPHILS # BLD: 0 K/UL (ref 0–0.2)
BASOPHILS NFR BLD: 1 %
DEPRECATED RDW RBC AUTO: 22.1 % (ref 12.4–15.4)
EOSINOPHIL # BLD: 0.1 K/UL (ref 0–0.6)
EOSINOPHIL NFR BLD: 2.1 %
HCT VFR BLD AUTO: 36.2 % (ref 40.5–52.5)
HGB BLD-MCNC: 12.1 G/DL (ref 13.5–17.5)
LYMPHOCYTES # BLD: 0.6 K/UL (ref 1–5.1)
LYMPHOCYTES NFR BLD: 13.5 %
MCH RBC QN AUTO: 30.9 PG (ref 26–34)
MCHC RBC AUTO-ENTMCNC: 33.4 G/DL (ref 31–36)
MCV RBC AUTO: 92.3 FL (ref 80–100)
MONOCYTES # BLD: 0.4 K/UL (ref 0–1.3)
MONOCYTES NFR BLD: 8.3 %
NEUTROPHILS # BLD: 3.3 K/UL (ref 1.7–7.7)
NEUTROPHILS NFR BLD: 75.1 %
PLATELET # BLD AUTO: 180 K/UL (ref 135–450)
PMV BLD AUTO: 7.8 FL (ref 5–10.5)
RBC # BLD AUTO: 3.92 M/UL (ref 4.2–5.9)
WBC # BLD AUTO: 4.3 K/UL (ref 4–11)

## 2023-05-07 PROCEDURE — 30901 CONTROL OF NOSEBLEED: CPT

## 2023-05-07 PROCEDURE — 2500000003 HC RX 250 WO HCPCS: Performed by: PHYSICIAN ASSISTANT

## 2023-05-07 PROCEDURE — 99283 EMERGENCY DEPT VISIT LOW MDM: CPT

## 2023-05-07 PROCEDURE — 6370000000 HC RX 637 (ALT 250 FOR IP): Performed by: PHYSICIAN ASSISTANT

## 2023-05-07 PROCEDURE — 85025 COMPLETE CBC W/AUTO DIFF WBC: CPT

## 2023-05-07 RX ORDER — TRANEXAMIC ACID 100 MG/ML
1000 INJECTION, SOLUTION INTRAVENOUS ONCE
Status: COMPLETED | OUTPATIENT
Start: 2023-05-07 | End: 2023-05-07

## 2023-05-07 RX ORDER — OXYMETAZOLINE HYDROCHLORIDE 0.05 G/100ML
2 SPRAY NASAL ONCE
Status: COMPLETED | OUTPATIENT
Start: 2023-05-07 | End: 2023-05-07

## 2023-05-07 RX ADMIN — OXYMETAZOLINE HCL 2 SPRAY: 0.05 SPRAY NASAL at 09:14

## 2023-05-07 RX ADMIN — TRANEXAMIC ACID 1000 MG: 100 INJECTION, SOLUTION INTRAVENOUS at 12:16

## 2023-05-07 ASSESSMENT — ENCOUNTER SYMPTOMS
BACK PAIN: 0
BLOOD IN STOOL: 0
RESPIRATORY NEGATIVE: 1
ABDOMINAL PAIN: 0
TROUBLE SWALLOWING: 0
NAUSEA: 0
EYES NEGATIVE: 1
VOMITING: 0

## 2023-05-07 ASSESSMENT — PAIN - FUNCTIONAL ASSESSMENT: PAIN_FUNCTIONAL_ASSESSMENT: NONE - DENIES PAIN

## 2023-05-10 ENCOUNTER — OFFICE VISIT (OUTPATIENT)
Dept: ENT CLINIC | Age: 78
End: 2023-05-10
Payer: MEDICARE

## 2023-05-10 VITALS
DIASTOLIC BLOOD PRESSURE: 84 MMHG | HEART RATE: 73 BPM | OXYGEN SATURATION: 94 % | BODY MASS INDEX: 27.58 KG/M2 | TEMPERATURE: 97.2 F | HEIGHT: 71 IN | WEIGHT: 197 LBS | SYSTOLIC BLOOD PRESSURE: 125 MMHG

## 2023-05-10 DIAGNOSIS — R04.0 EPISTAXIS: Primary | ICD-10-CM

## 2023-05-10 PROCEDURE — G8419 CALC BMI OUT NRM PARAM NOF/U: HCPCS | Performed by: OTOLARYNGOLOGY

## 2023-05-10 PROCEDURE — G8427 DOCREV CUR MEDS BY ELIG CLIN: HCPCS | Performed by: OTOLARYNGOLOGY

## 2023-05-10 PROCEDURE — 99202 OFFICE O/P NEW SF 15 MIN: CPT | Performed by: OTOLARYNGOLOGY

## 2023-05-10 PROCEDURE — 1036F TOBACCO NON-USER: CPT | Performed by: OTOLARYNGOLOGY

## 2023-05-10 PROCEDURE — 1123F ACP DISCUSS/DSCN MKR DOCD: CPT | Performed by: OTOLARYNGOLOGY

## 2023-05-10 PROCEDURE — 30901 CONTROL OF NOSEBLEED: CPT | Performed by: OTOLARYNGOLOGY

## 2023-05-10 ASSESSMENT — ENCOUNTER SYMPTOMS
EYE REDNESS: 0
DIARRHEA: 0
RHINORRHEA: 0
SINUS PAIN: 0
CHOKING: 0
EYE PAIN: 0
FACIAL SWELLING: 0
EYE ITCHING: 0
SORE THROAT: 0
TROUBLE SWALLOWING: 0
COUGH: 0
SINUS PRESSURE: 0
NAUSEA: 0
SHORTNESS OF BREATH: 0
VOICE CHANGE: 0

## 2023-05-10 NOTE — PROGRESS NOTES
Subjective:      Patient ID: Glenn Holcomb is a 69 y.o. male. HPI  Chief Complaint   Patient presents in consultation from Dr. Gudelia Farfan    Epistaxis  History of Present Illness:Luis Antonio is a(n) 69 y.o. male who presents with right sided epistaxis. Packed three days ago in ER. Patient states multiple nasal packings in past. Never admitted. On Eliquis. Multiple clots. Cirrhosis. Denies fractures. Does not pick his nose. Would like definitive plan. Patient Active Problem List   Diagnosis    History of liver failure    Liver mass    Cirrhosis ([address]    Portal vein thrombosis    Hx of pulmonary embolus    Constipation    Possible Hepatocellular carcinoma (HCC)    Acute hepatitis C virus infection without hepatic coma     Past Surgical History:   Procedure Laterality Date    COLONOSCOPY N/A 2023    COLONOSCOPY POLYPECTOMY SNARE/COLD BIOPSY performed by Mitzy Roche MD at [facility] 2023    EGD BAND LIGATION performed by Mitzy Roche MD at [facility] ENDOSCOPY     No family history on file.     Social History     Socioeconomic History    Marital status: Single     Spouse name: Not on file    Number of children: Not on file    Years of education: Not on file    Highest education level: Not on file   Occupational History    Not on file   Tobacco Use    Smoking status: Former     Types: Cigarettes     Quit date: 6/3/2005     Years since quittin.9    Smokeless tobacco: Not on file   Substance and Sexual Activity    Alcohol use: No    Drug use: Not on file    Sexual activity: Not on file   Other Topics Concern    Not on file   Social History Narrative    Not on file     Social Determinants of Health     Financial Resource Strain: Not on file   Food Insecurity: Not on file   Transportation Needs: Not on file   Physical Activity: Not on file   Stress: Not on file   Social Connections: Not on file   Intimate Partner Violence: Not on file   Housing Stability:

## (undated) DEVICE — KIT LIGATION SMARTBAND

## (undated) DEVICE — TRAP SPEC RETRV CLR PLAS POLYP IN LN SUCT QUIK CTCH

## (undated) DEVICE — SNARE COLD DIAMOND 10MM THIN

## (undated) DEVICE — CANNULA SAMP CO2 AD GRN 7FT CO2 AND 7FT O2 TBNG UNIV CONN